# Patient Record
Sex: MALE | Race: WHITE | Employment: FULL TIME | URBAN - METROPOLITAN AREA
[De-identification: names, ages, dates, MRNs, and addresses within clinical notes are randomized per-mention and may not be internally consistent; named-entity substitution may affect disease eponyms.]

---

## 2017-01-17 ENCOUNTER — HOSPITAL ENCOUNTER (OUTPATIENT)
Dept: LAB | Facility: CLINIC | Age: 42
Discharge: HOME | End: 2017-01-17
Attending: PHYSICIAN ASSISTANT | Admitting: PHYSICIAN ASSISTANT

## 2017-01-17 ENCOUNTER — OFFICE VISIT (OUTPATIENT)
Dept: SURGERY | Facility: CLINIC | Age: 42
End: 2017-01-17

## 2017-01-17 VITALS
RESPIRATION RATE: 16 BRPM | TEMPERATURE: 97.8 F | HEART RATE: 70 BPM | BODY MASS INDEX: 43.08 KG/M2 | OXYGEN SATURATION: 96 % | SYSTOLIC BLOOD PRESSURE: 108 MMHG | HEIGHT: 71 IN | DIASTOLIC BLOOD PRESSURE: 62 MMHG | WEIGHT: 307.7 LBS

## 2017-01-17 DIAGNOSIS — Z13.228 SCREENING FOR ENDOCRINE, NUTRITIONAL, METABOLIC AND IMMUNITY DISORDER: ICD-10-CM

## 2017-01-17 DIAGNOSIS — Z13.29 SCREENING FOR ENDOCRINE, NUTRITIONAL, METABOLIC AND IMMUNITY DISORDER: ICD-10-CM

## 2017-01-17 DIAGNOSIS — E11.9 TYPE 2 DIABETES MELLITUS WITHOUT COMPLICATION, WITHOUT LONG-TERM CURRENT USE OF INSULIN (H): ICD-10-CM

## 2017-01-17 DIAGNOSIS — E66.01 MORBID OBESITY WITH BMI OF 40.0-44.9, ADULT (H): ICD-10-CM

## 2017-01-17 DIAGNOSIS — E78.5 DYSLIPIDEMIA: ICD-10-CM

## 2017-01-17 DIAGNOSIS — Z13.21 SCREENING FOR ENDOCRINE, NUTRITIONAL, METABOLIC AND IMMUNITY DISORDER: ICD-10-CM

## 2017-01-17 DIAGNOSIS — E78.5 DYSLIPIDEMIA: Primary | ICD-10-CM

## 2017-01-17 DIAGNOSIS — Z13.0 SCREENING FOR ENDOCRINE, NUTRITIONAL, METABOLIC AND IMMUNITY DISORDER: ICD-10-CM

## 2017-01-17 DIAGNOSIS — Z13.0 SCREENING FOR IRON DEFICIENCY ANEMIA: ICD-10-CM

## 2017-01-17 DIAGNOSIS — E66.01 MORBID OBESITY WITH BMI OF 40.0-44.9, ADULT (H): Primary | ICD-10-CM

## 2017-01-17 LAB
ALBUMIN SERPL-MCNC: 4.1 G/DL (ref 3.4–5)
ALP SERPL-CCNC: 69 U/L (ref 40–150)
ALT SERPL W P-5'-P-CCNC: 33 U/L (ref 0–70)
ANION GAP SERPL CALCULATED.3IONS-SCNC: 7 MMOL/L (ref 3–14)
AST SERPL W P-5'-P-CCNC: 22 U/L (ref 0–45)
BILIRUB SERPL-MCNC: 0.5 MG/DL (ref 0.2–1.3)
BUN SERPL-MCNC: 11 MG/DL (ref 7–30)
CALCIUM SERPL-MCNC: 9.3 MG/DL (ref 8.5–10.1)
CHLORIDE SERPL-SCNC: 103 MMOL/L (ref 94–109)
CO2 SERPL-SCNC: 28 MMOL/L (ref 20–32)
CREAT SERPL-MCNC: 0.84 MG/DL (ref 0.66–1.25)
ERYTHROCYTE [DISTWIDTH] IN BLOOD BY AUTOMATED COUNT: 12.7 % (ref 10–15)
GFR SERPL CREATININE-BSD FRML MDRD: NORMAL ML/MIN/1.7M2
GLUCOSE SERPL-MCNC: 92 MG/DL (ref 70–99)
HBA1C MFR BLD: 6.3 % (ref 4.3–6)
HCT VFR BLD AUTO: 43.5 % (ref 40–53)
HGB BLD-MCNC: 14.9 G/DL (ref 13.3–17.7)
MCH RBC QN AUTO: 30.2 PG (ref 26.5–33)
MCHC RBC AUTO-ENTMCNC: 34.3 G/DL (ref 31.5–36.5)
MCV RBC AUTO: 88 FL (ref 78–100)
PLATELET # BLD AUTO: 270 10E9/L (ref 150–450)
POTASSIUM SERPL-SCNC: 4 MMOL/L (ref 3.4–5.3)
PROT SERPL-MCNC: 8.3 G/DL (ref 6.8–8.8)
RBC # BLD AUTO: 4.93 10E12/L (ref 4.4–5.9)
SODIUM SERPL-SCNC: 138 MMOL/L (ref 133–144)
WBC # BLD AUTO: 10.1 10E9/L (ref 4–11)

## 2017-01-17 PROCEDURE — 85027 COMPLETE CBC AUTOMATED: CPT | Performed by: PHYSICIAN ASSISTANT

## 2017-01-17 PROCEDURE — 97802 MEDICAL NUTRITION INDIV IN: CPT | Performed by: DIETITIAN, REGISTERED

## 2017-01-17 PROCEDURE — 99205 OFFICE O/P NEW HI 60 MIN: CPT | Performed by: PHYSICIAN ASSISTANT

## 2017-01-17 PROCEDURE — 80053 COMPREHEN METABOLIC PANEL: CPT | Performed by: PHYSICIAN ASSISTANT

## 2017-01-17 PROCEDURE — 83036 HEMOGLOBIN GLYCOSYLATED A1C: CPT | Performed by: PHYSICIAN ASSISTANT

## 2017-01-17 PROCEDURE — 36415 COLL VENOUS BLD VENIPUNCTURE: CPT | Performed by: PHYSICIAN ASSISTANT

## 2017-01-17 PROCEDURE — 82306 VITAMIN D 25 HYDROXY: CPT | Performed by: PHYSICIAN ASSISTANT

## 2017-01-17 NOTE — PROGRESS NOTES
Name: MARS PIERCE  : 1975  Gender: Male  MRN: [id number]  Age: 41  INITIAL BARIATRIC NUTRITION ASSESSMENT  REASON FOR VISIT:  MARS PIERCE is a 41 year old Male presents today for initial nutrition visit for bariatric surgery. He was accompanied by his wife, Pineda.  DIAGNOSIS:  Class III Obesity  ANTHROPOMETRICS:  Height: 71 inches  Weight: 307.7 lbs  BMI: 42.9 kg/m2  CURRENT SUPPLEMENTS:  Multivitamin/Mineral: none  WEIGHT LOSS ATTEMPTS:  Atkins  Prescription diet medications:  None  NUTRITION HISTORY:  Meals per day: 2  Snacking: Yes  Breakfast: 2 florence bead. 2 eggs/ or bowl of cearal  Lunch: skip  Supper: 5pm / bowel of rice -4-6 oz protien / some vegtables  Snacks: chips/ vegi/ fruits  Drinks: water / no pop or juice/ 8 oz coffee twice daily/ cup of tea late night  Emotional eating: Yes  Binge eating: Yes  Night eating: No  Can you afford three meals per day? Yes  Can you afford the $50-60 per month for vitamins? Yes  Comments: Pt has been thinking about surgery for a few years, does not know anyone who has had it but has done some research online. Pt is a restaurant owner. Has 5 kids and shares that family meals are a concern of his with the changes he'll be making. Pt and wife had many appropriate questions today. Binge eating and emotional eating discussed with PA - will come home from work hungry after skipping meals and then overeat. Pt does not consume pork.   DINING OUT HISTORY:  Frequency per week: Around once a week  Location: sit-down restaurants, fast food  Type of food: i eat taco bell at least 2 times aweek.   PHYSICAL ACTIVITY:  None  NUTRITION DIAGNOSIS:  Patient with excessive oral food/beverage intake related to intake of calorically dense food/beverages at meals and/or snacks as evidenced by BMI of 42.9kg/m2.   INTERVENTION:  Nutrition Prescription: Recommend nutrient/ energy modification   Goals:  Begin taking multivitamin, calcium and vitamin D supplements according to handbook.   Increase  exercise to 3x/week.   [place] with protein drinks   Practice chewing to applesauce consistency   Practice  fluids from meals by 30 minutes   Practice taking 20-30 minutes to finish your meal.   Implementation:  Provided written guidelines for Laparoscopic Gastric Bypass surgery.  Provided weight loss suggestions.  Explained diet changes that would occur after surgery.  Emphasized importance of diet and lifestyle modifications.  Discussed necessity for chewable multivitamin/ mineral supplements, calcium with vitamin D, vitamin B-12, vitamin D, Iron and vitamin C supplements.  NUTRITION MONITORING AND EVALUATION:  Verbalizes understanding of surgery diet guidelines.  Anticipated compliance: Fair to good    FOLLOW UP: Follow up  Recommend 2 to 3 follow up visits to assist with lifestyle changes or per insurance requirements.  RD name and number provided.  TIME SPENT WITH PATIENT: 50 minutes  Rachel Winston RD, LD  Clinical Dietitian

## 2017-01-17 NOTE — Clinical Note
Thank you for referring this patient to our bariatric clinic for an initial evaluation.  He has been going to Dr. Wilcox with [facility] for the past 2 years but would like to get reestablished with you.  He says you originally referred him for bariatric surgery.  I look forward to working with you during this process.  Here is a copy of my visit.  Please let me know if you have questions.  Sincerely,   Mely Cyr PA-C

## 2017-01-17 NOTE — MR AVS SNAPSHOT
After Visit Summary   1/17/2017    Edelmira Sharp    MRN: [id number]           Patient Information     Date Of Birth          1975        Visit Information        Provider Department      1/17/2017 1:00 PM Mely Cyr PA-C [facility]      Today's Diagnoses     Dyslipidemia    -  1     Screening for iron deficiency anemia         Screening for endocrine, nutritional, metabolic and immunity disorder         Morbid obesity with BMI of 40.0-44.9, adult (H)         Type 2 diabetes mellitus without complication, without long-term current use of insulin (H)           Care Instructions    Please refer to your task list created today at your appointment.  Make appointment to return to clinic in 1 month to see the dietician.  Have initial labs drawn.     [facility] if you would like to have your blood tests drawn today or call [phone] to make an appointment to have them drawn on another day.  You may also get your blood drawn at your [facility] home clinic.          Follow-ups after your visit        Follow-up notes from your care team     Return in 1 month (on 2/17/2017).      Future tests that were ordered for you today     Open Future Orders        Priority Expected Expires Ordered    CBC with platelets Routine  7/16/2017 1/17/2017    Comprehensive metabolic panel Routine  7/16/2017 1/17/2017    Hemoglobin A1c Routine  7/16/2017 1/17/2017    Vitamin D Deficiency Routine  7/16/2017 1/17/2017            Who to contact     If you have questions or need follow up information about today's clinic visit or your schedule please contact [facility] directly at [phone].  Normal or non-critical lab and imaging results will be communicated to you by MyChart, letter or phone within 4 business days after the clinic has received the results. If you do not  "hear from us within 7 days, please contact the clinic through [employer] or phone. If you have a critical or abnormal lab result, we will notify you by phone as soon as possible.  Submit refill requests through [employer] or call your pharmacy and they will forward the refill request to us. Please allow 3 business days for your refill to be completed.          Additional Information About Your Visit        [employer] Information     [employer] gives you secure access to your electronic health record. If you see a primary care provider, you can also send messages to your care team and make appointments. If you have questions, please call your primary care clinic.  If you do not have a primary care provider, please call [phone] and they will assist you.        Care EveryWhere ID     This is your Care EveryWhere ID. This could be used by other organizations to access your [facility] medical records  [id number]        Your Vitals Were     Respirations Height BMI (Body Mass Index)             16 5' 11\" (1.803 m) 42.93 kg/m2          Blood Pressure from Last 3 Encounters:   10/27/14 130/80   03/13/14 121/78    Weight from Last 3 Encounters:   01/17/17 307 lb 11.2 oz (139.572 kg)   10/27/14 307 lb 12.8 oz (139.617 kg)   03/13/14 313 lb (141.976 kg)              We Performed the Following     OP ROOMING NOTE TO MICHELET        Primary Care Provider Office Phone # Fax #    Smita Montoya [phone]       [facility] [address] [facility] [address]        Thank you!     Thank you for choosing [facility]  for your care. Our goal is always to provide you with excellent care. Hearing back from our patients is one way we can continue to improve our services. Please take a few minutes to complete the written survey that you may receive in the mail after your visit with us. Thank you!             Your Updated Medication List - Protect others around you: Learn how to safely " use, store and throw away your medicines at www.disposemymeds.org.          This list is accurate as of: 1/17/17  2:21 PM.  Always use your most recent med list.                   Brand Name Dispense Instructions for use    metFORMIN 1000 MG tablet    GLUCOPHAGE     Take 500 mg by mouth 2 times daily (with meals)

## 2017-01-17 NOTE — PATIENT INSTRUCTIONS
Please refer to your task list created today at your appointment.  Make appointment to return to clinic in 1 month to see the dietician.  Have initial labs drawn.     [facility] if you would like to have your blood tests drawn today or call [phone] to make an appointment to have them drawn on another day.  You may also get your blood drawn at your [facility] clinic.

## 2017-01-17 NOTE — PROGRESS NOTES
"[facility]  [address]  [place] [address]  Phone [phone] Fax [phone]    Date: 2017    RE:   MARS PIERCE  MR#: [id number]  : 1975    Dear [facility]    I had the pleasure of seeing your patient, MARS PIERCE, to evaluate his obesity and consider him for possible weight loss surgery.  As you know, MARS is 41 years old. He has been overweight since early childhood. He has been morbidly obese for the last 15 years and has been unable to achieve long-term success with weight loss attempts, such as: Atkins.     Comorbidities of obesity from his past medical history include: High cholesterol, Type II Diabetes- dx .  Current HgA1C 6.    The symptoms related to his obesity include Knee pain and Shortness of Breath with activity.    Non-Obesity Related Past Medical History:  None    Past Surgical History: No previous bleeding, anesthesia, or nausea concerns with surgery.  Appendix removal    Family History:  Father- High cholesterol  Mother- Diabetes  PGF- High blood pressure  PGM- Diabetes;High blood pressure;High cholesterol;Obese  Sibling 1- Type II diabetes  Sibling 2- None  Sibling 3- None    Social History:  He is  to Prashant. They have 5 children.  Ages 11 to 2 years old.  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    ETOH: Never  Illicit Drug Use: None  Tobacco Use: No  Support system: prashant perera will be available to help the patient in the early post op period. prashant perera is who the patient can count on for support throughout his weight loss journey.  Can you afford three meals per day? Yes  ?Can you afford the $50-60 per month for vitamins? Yes    A 14 point review of system shows:  Musculoskeletal: Knee pain,  Pulmonary: Shortness of Breath with activity    /62 mmHg  Pulse 70  Temp(Src) 97.8  F (36.6  C)  Resp 16  Ht 5' 11\" (1.803 m)  Wt 307 lb 11.2 oz (139.572 kg)  BMI 42.93 kg/m2  SpO2 96%    PHYSICAL " EXAMINATION:    GENERAL: obese, good health, good development, and normal affect., Alert and oriented x3. NAD  HEENT: Trachea midline, Neck supple without lymphadenopathy, thyroidmegaly, or mass., Oral dentition adequate for chewing  CARDIOVASCULAR: Regular rate and rhythm, No murmurs, rubs or gallops  RESPIRATORY: Good breath sounds, Lung sounds clear to auscultation bilaterally  GASTROINTESTINAL: Obese, Soft, Nontender, Non-distended, No organomeagly or masses  LOWER EXTREMITIES: No LE edema. No cyanosis, ulceration, or chronic venous stasis  MUSCULOSKELETAL: Normal gait, Moves all 4 extremities equal and strong  NEUROLOGIC: cranial nerves II-XII grossly intact  SKIN: No intertriginous irritation or rash    In summary, MARS is morbidly obese with a body mass index of 42.9 kg/m2 and type II diabetes and hyperlipidemia. He attended an informational seminar and is a candidate for gastric bypass. He will have to complete the following pre-requisites:    Received weight loss goal of 5 lb prior to surgery.  Achieve clearance from dietitian to see surgeon. (pt unsure if his insurance requires structured weight loss- he will review and let us know at his next visit)  Have preoperative laboratory tests drawn. (CBC, CMP, HgA1C, Vitamin D)  Psychological Evaluation with MMPI and clearance for weight loss surgery.    Today in the office we discussed gastric bypass surgery.  Preoperative, perioperative, and postoperative processes, management, and follow up were addressed.  Risks and benefits were outlined including the risk of death, PE, DVT, ulcer, N/V, stricture, hernia, wound infection, weight regain, and vitamin deficiencies. I emphasized exercise and activity along with appropriate food choice as the main foundation for weight loss with surgery providing surgical reinforcement of this. All questions were answered.   A goal sheet and support group handout were given to the patient. Patient contract was signed.     Once  the patient has completed the requirements set forth in paragraph one, and there are no further recommendations, he will be allowed to see the surgeon of his choice for consultation on the gastric bypass surgery. Patient verbalizes understanding of the process to surgery and expectations for the postoperative period including the need for lifelong lifestyle changes, vitamin supplementation, and laboratory monitoring.    Thank you for allowing us to participate in his care.    This was a 60 minute visit with greater than 50% spent on counseling.      Sincerely,        Mely Cyr PA-C    At [facility] we are committed to providing you exceptional care. Our surgeons specialize in performing the following minimally invasive weight-loss surgeries:    Mauro-en-Y gastric bypass  Laparoscopic adjustable gastric band  Sleeve gastrectomy    If you would like to review aspects of our weight loss surgery program, please visit our website at [email or web address]. If you have any questions, please do not hesitate to contact us at [phone].

## 2017-01-18 LAB — DEPRECATED CALCIDIOL+CALCIFEROL SERPL-MC: 22 UG/L (ref 20–75)

## 2017-02-16 ENCOUNTER — OFFICE VISIT (OUTPATIENT)
Dept: SURGERY | Facility: CLINIC | Age: 42
End: 2017-02-16
Payer: MEDICAID

## 2017-02-16 DIAGNOSIS — E66.01 MORBID OBESITY WITH BMI OF 40.0-44.9, ADULT (H): ICD-10-CM

## 2017-02-16 PROCEDURE — 97803 MED NUTRITION INDIV SUBSEQ: CPT | Performed by: DIETITIAN, REGISTERED

## 2017-02-16 NOTE — PROGRESS NOTES
PRE-SURGICAL WEIGHT LOSS NUTRITION APPOINTMENT  DATE OF VISIT: 2017  Name: MARS PIERCE  : 1975  Gender: Male  MRN: [id number]  Age: 41  ASSESSMENT  REASON FOR VISIT:  MARS PIERCE is a 41 year old Male presents today for a pre-surgical weight loss follow-up appointment.  DIAGNOSIS:  Class III Obesity    ANTHROPOMETRICS:  Height: 71 inches  Initial Weight: 307.7 lbs  Weight last visit: 307.7 lbs  Current Weight: 299.6 lbs  BMI: 41.8 kg/m2    NUTRITION HISTORY:  Breakfast: (10am; wakes at 9am) cup of oatmeal w/milk, 2 eggs w/olive oil, 1 piece of wheat toast, florence bread w/hummus  Lunch: skips or has veggies, or veggies w/rice (Sami food)  Supper: (4pm)steak, chicken breast, lentil soup, lamb, vegetables, stews  Snacks: vegetables and fruits  Drinks: water - 60oz  Consuming liquid calories: none  Eating 3 meals per day: Sometimes  Eating slower: sometimes  Chewing foods thoroughly:Sometimes  Take 30 minutes to consume each meal: Sometimes  Fluids and meals separate by at least 30 minutes: Yes  Comments: Pt has been thinking about surgery for a few years, does not know anyone who has had it but has done some research online. 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 Pt and wife had many appropriate questions today. : Pt with excellent weight loss and progress on goals. Recently changed insurance - will call tomorrow to discuss structured weight loss requirements. Many questions regarding body fat percentage, weight loss expectations and appropriate BMI; showed pt how to calculate his BMI and discussed healthy ranges.   Desired Surgical Procedure: Laparoscopic Gastric Bypass  PHYSICAL ACTIVITY:  Type: Cardio;Lift weights  Frequency: 3-4x/week  Duration (min): 60  DIAGNOSIS:  Previous Nutrition Diagnosis: Obesity related to excess energy intake as evidenced by BMI of 42.9kg/m2.   Unchanged, modified below.   Previous goals:  Begin taking multivitamin, calcium and  vitamin D supplements according to handbook. - met  Increase exercise to 3x/week. - met  [place] with protein drinks - met  Practice chewing to applesauce consistency - met  Practice  fluids from meals by 30 minutes - met  Practice taking 20-30 minutes to finish your meal. - met  Current Nutrition Diagnosis: Obesity related to excess energy intake as evidenced by BMI of 41.8kg/m2.   IMPLEMENTATION:  Nutrition Prescription: Recommended energy/nutrient modification  Goals  Continue to practice  fluids and meals by 30 minutes.   Practice chewing to applesauce consistency.   Take 20-30 minutes to finish a meal.   Eliminate final cup of coffee.   Eat 3 meals a day - every 5-6 hours.   Implementation:  - Discussed progress towards previous goals  - Reinforced importance of making behavior changes in preparation for bariatric surgery.  NUTRITION MONITORING AND EVALUATION:  Anticipated compliance: Good  Patient verbalized good understanding of bariatric diet guidelines.    Follow up: in 1 month  # of visits needed: 1; or per insurance requirements (see comments)  Cleared by RD: No  TIME SPENT WITH PATIENT: 25 minutes  Rachel Winston RD, LD  Clinical Dietitian

## 2017-02-16 NOTE — MR AVS SNAPSHOT
MRN:[id number]                      After Visit Summary   2/16/2017    Edelmira Sharp    MRN: [id number]           Visit Information        Provider Department      2/16/2017 11:30 AM 3Wendy RD [facility] [place] [facility]      Your next 10 appointments already scheduled     Mar 16, 2017 11:30 AM CDT   Weight Loss Visit with Sh Wl Diet 3, RD   [facility])    [address] [place] [id number] [phone]              MyChart Information     [employer] gives you secure access to your electronic health record. If you see a primary care provider, you can also send messages to your care team and make appointments. If you have questions, please call your primary care clinic.  If you do not have a primary care provider, please call [phone] and they will assist you.        Care EveryWhere ID     This is your Care EveryWhere ID. This could be used by other organizations to access your [facility] medical records  [id number]

## 2017-03-16 ENCOUNTER — OFFICE VISIT (OUTPATIENT)
Dept: SURGERY | Facility: CLINIC | Age: 42
End: 2017-03-16
Payer: COMMERCIAL

## 2017-03-16 DIAGNOSIS — E66.01 MORBID OBESITY WITH BMI OF 40.0-44.9, ADULT (H): ICD-10-CM

## 2017-03-16 PROCEDURE — 97803 MED NUTRITION INDIV SUBSEQ: CPT | Performed by: DIETITIAN, REGISTERED

## 2017-03-16 NOTE — MR AVS SNAPSHOT
MRN:[id number]                      After Visit Summary   3/16/2017    Edelmira Sharp    MRN: [id number]           Visit Information        Provider Department      3/16/2017 11:30 AM 3Wendy RD [facility] [place] [facility]      Your next 10 appointments already scheduled     Apr 18, 2017  2:30 PM CDT   Weight Loss Visit with Sh Wl Diet 3, RD   [facility] ([facility])    [address] [place] [id number] [phone]              MyChart Information     [employer] gives you secure access to your electronic health record. If you see a primary care provider, you can also send messages to your care team and make appointments. If you have questions, please call your primary care clinic.  If you do not have a primary care provider, please call [phone] and they will assist you.        Care EveryWhere ID     This is your Care EveryWhere ID. This could be used by other organizations to access your [facility] medical records  [id number]

## 2017-03-16 NOTE — PROGRESS NOTES
PRE-SURGICAL WEIGHT LOSS NUTRITION APPOINTMENT  DATE OF VISIT: 3/16/2017  Name: MARS PIERCE  : 1975  Gender: Male  MRN: [id number]  Age: 41  ASSESSMENT  REASON FOR VISIT:  MARS PIERCE is a 41 year old Male presents today for a pre-surgical weight loss follow-up appointment. He was accompanied by his wife, Pineda.   DIAGNOSIS:  Class III Obesity    ANTHROPOMETRICS:  Height: 71 inches  Initial Weight: 307.7 lbs  Weight last visit: 299.6 lbs  Current Weight: 297.9 lbs  BMI: 41.5 kg/m2    NUTRITION HISTORY:  Breakfast: eggs, hummus, small/thin florence bread  Lunch: (sometimes skips d/t work)   Supper: soups, stews  Snacks: none  Drinks: water - 60oz, small amount of coffee  Consuming liquid calories: none  Eating 3 meals per day: No  Eating slower: No  Chewing foods thoroughly: Yes  Take 30 minutes to consume each meal: Sometimes  Fluids and meals separate by at least 30 minutes: Yes  Comments: Pt has been thinking about surgery for a few years, does not know anyone who has had it but has done some research online. 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 Pt and wife had many appropriate questions today. : Pt with excellent weight loss and progress on goals. Recently changed insurance - will call tomorrow to discuss structured weight loss requirements. 3/16: Pt has switched to [employer] - has first phone call     3/24. Pt and wife share some concerns they have regarding surgery - weakness, complications, etc. They are fairly sure they want to pursue surgery but not quite 100% certain. Encouraged pt that it's normal and appropriate to have anxieties about surgery and that there's no need to rush the process.   Desired Surgical Procedure: Laparoscopic Gastric Bypass  PHYSICAL ACTIVITY:  Type: Cardio;Lift weights  Frequency: 3-4x/week  Duration (min): 60  DIAGNOSIS:  Previous Nutrition Diagnosis: Obesity related to excess energy intake as evidenced by BMI of  41.8kg/m2.   Unchanged, modified below.   Previous goals:  Continue to practice  fluids and meals by 30 minutes. - met  Practice chewing to applesauce consistency. -improving  Take 20-30 minutes to finish a meal. -improving  Eliminate final cup of coffee. - met (however just gave up yesterday)  Eat 3 meals a day - every 5-6 hours. - not met  Current Nutrition Diagnosis: Obesity related to excess energy intake as evidenced by BMI of 41.5kg/m2.   IMPLEMENTATION:  Nutrition Prescription: Recommended energy/nutrient modification  Goals:  Continue to practice taking 20-30 minutes per meal and chewing to applesauce consistency.   Eliminate coffee/continue to avoid  Keep food on hand at work - don't skip lunch.   Implementation:  - Discussed progress towards previous goals  - Reinforced importance of making behavior changes in preparation for bariatric surgery.  NUTRITION MONITORING AND EVALUATION:  Anticipated compliance: Good  Patient verbalized good understanding of bariatric diet guidelines.  Follow up: 1 month  # of visits needed: 1  Cleared by RD: No  TIME SPENT WITH PATIENT: 25 minutes  Rachel Winston RD, LD  Clinical Dietitian

## 2017-04-18 ENCOUNTER — OFFICE VISIT (OUTPATIENT)
Dept: SURGERY | Facility: CLINIC | Age: 42
End: 2017-04-18
Payer: COMMERCIAL

## 2017-04-18 DIAGNOSIS — E66.01 MORBID OBESITY WITH BMI OF 40.0-44.9, ADULT (H): ICD-10-CM

## 2017-04-18 PROCEDURE — 97803 MED NUTRITION INDIV SUBSEQ: CPT | Performed by: DIETITIAN, REGISTERED

## 2017-04-18 NOTE — MR AVS SNAPSHOT
MRN:[id number]                      After Visit Summary   4/18/2017    Edelmira Sharp    MRN: [id number]           Visit Information        Provider Department      4/18/2017 2:30 PM 3, Sh Katie Medina RD [facility] [place] [facility]      MyChart Information     [employer] gives you secure access to your electronic health record. If you see a primary care provider, you can also send messages to your care team and make appointments. If you have questions, please call your primary care clinic.  If you do not have a primary care provider, please call [phone] and they will assist you.        Care EveryWhere ID     This is your Care EveryWhere ID. This could be used by other organizations to access your [facility] medical records  [id number]

## 2017-04-18 NOTE — PROGRESS NOTES
"PRE-SURGICAL WEIGHT LOSS NUTRITION APPOINTMENT  DATE OF VISIT: 2017  Name: MARS PIERCE  : 1975  Gender: Male  MRN: [id number]  Age: 41  ASSESSMENT  REASON FOR VISIT:  MARS PIERCE is a 41 year old Male presents today for a pre-surgical weight loss follow-up appointment. He is accompanied by his wife, Pineda.   DIAGNOSIS:  Class III Obesity    ANTHROPOMETRICS:  Height: 71 inches  Initial Weight: 307.7 lbs  Weight last visit: 297.9 lbs  Current Weight: 288.0 lbs  BMI: 40.2 kg/m2    NUTRITION HISTORY:  Breakfast: eggs, hummus, small/thin florence bread  Lunch: previously skipping; lately has been bringing food from home  Supper: soups, stews  Snacks: none  Drinks: water - 60oz  Consuming liquid calories: none  Eating 3 meals per day: Yes  Eating slower: Yes  Chewing foods thoroughly: Yes  Take 30 minutes to consume each meal: Yes  Fluids and meals separate by at least 30 minutes: Yes  Comments: Pt has been thinking about surgery for a few years, does not know anyone who has had it but has done some research online. 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 Pt and wife had many appropriate questions today. : Pt with excellent weight loss and progress on goals. Recently changed insurance - will call tomorrow to discuss structured weight loss requirements. 3/16: Pt has switched to [facility] - has first phone call 3/24. Pt and wife share some concerns they have regarding surgery - weakness, complications, etc. They are fairly sure they want to pursue surgery but not quite 100% certain. Encouraged pt that it's normal and appropriate to have anxieties about surgery and that there's no need to rush the process.     : Pt states he is \"80%\" sure he wants surgery. Pt consistent with all dietary habits and excellent weight loss. Encouraged pt to address his reservations surrounding surgery at his remaining psych eval appointments. Will clear pt today however " recommended pt delay consult with surgeon if still undecided about pursuit of surgery. If timeline to surgery lengthy, would recommend check in visit with RD prior to surgery.   Desired Surgical Procedure: Laparoscopic Gastric Bypass  PHYSICAL ACTIVITY:  Type: Cardio;Lift weights  Frequency: 3-4x/week  Duration (min): 60  DIAGNOSIS:  Previous Nutrition Diagnosis: Obesity related to excess energy intake as evidenced by BMI of 41.5kg/m2.   Unchanged, modified below.   Previous goals:  Continue to practice taking 20-30 minutes per meal and chewing to applesauce consistency. - met   Eliminate coffee - met   Keep food on hand at work - don't skip lunch. - met  Current Nutrition Diagnosis: Obesity related to excess energy intake as evidenced by BMI of 40.2kg/m2.   IMPLEMENTATION:  Nutrition Prescription: Recommended energy/nutrient modification  Goals:  Continue to practice all pre-surgical bariatric guidelines.   Implementation:  - Discussed progress towards previous goals  - Reinforced importance of making behavior changes in preparation for bariatric surgery.  NUTRITION MONITORING AND EVALUATION:  Anticipated compliance: Good  Patient verbalized good understanding of bariatric diet guidelines.  Patient has met prebariatric surgery diet requirements.  Follow up: Standard post-operative follow up at clinic  # of visits needed: 0  Cleared by RD:Yes  TIME SPENT WITH PATIENT: 20 minutes  Rachel Winston RD, LD  Clinical Dietitian

## 2017-10-09 ENCOUNTER — TELEPHONE (OUTPATIENT)
Dept: SURGERY | Facility: CLINIC | Age: 42
End: 2017-10-09

## 2017-10-09 NOTE — TELEPHONE ENCOUNTER
"Patient calling to restart program.  He has been out less than 6 months.  His last visit was 4/18/17.    He still qualifies.  His stated wt. is 269# and he is 71\" BMI of 37.7.  He is diabetic and on meds. He states he also has hyperlipidemia.    He has lost all of his weight and more.  He only has to complete his psychological evaluation per task list.  He is [place] and needs 5 phone calls. I am not sure where he is with this.    Because he hasn't been out of the program less than 6 months, he can resume seeing the RD for 30\".  I will call and remind him to finish his 5 phone.  He was submitted to  3/9/17 to get the phone calls going.  Rosa Crouch MS, RD, RN    Called patient who stated that he completed his  5 phone calls.  Rosa Crouch MS, RD, RN      "

## 2017-10-16 ENCOUNTER — TRANSFERRED RECORDS (OUTPATIENT)
Dept: HEALTH INFORMATION MANAGEMENT | Facility: CLINIC | Age: 42
End: 2017-10-16

## 2017-10-17 ENCOUNTER — OFFICE VISIT (OUTPATIENT)
Dept: SURGERY | Facility: CLINIC | Age: 42
End: 2017-10-17
Payer: COMMERCIAL

## 2017-10-17 VITALS
WEIGHT: 271 LBS | HEIGHT: 71 IN | DIASTOLIC BLOOD PRESSURE: 69 MMHG | RESPIRATION RATE: 18 BRPM | HEART RATE: 67 BPM | BODY MASS INDEX: 37.94 KG/M2 | SYSTOLIC BLOOD PRESSURE: 108 MMHG | OXYGEN SATURATION: 98 % | TEMPERATURE: 98.2 F

## 2017-10-17 DIAGNOSIS — E66.9 OBESITY: ICD-10-CM

## 2017-10-17 DIAGNOSIS — E66.01 MORBID OBESITY WITH BMI OF 40.0-44.9, ADULT (H): ICD-10-CM

## 2017-10-17 DIAGNOSIS — E66.9 OBESITY (BMI 30-39.9): Primary | ICD-10-CM

## 2017-10-17 PROCEDURE — 99213 OFFICE O/P EST LOW 20 MIN: CPT | Performed by: PHYSICIAN ASSISTANT

## 2017-10-17 PROCEDURE — 97803 MED NUTRITION INDIV SUBSEQ: CPT | Performed by: DIETITIAN, REGISTERED

## 2017-10-17 NOTE — PROGRESS NOTES
"Bariatric Visit    October 17, 2017    HPI:  Pt return to clinic today after a brief hiatus for our program. He was seen by me on 1/17/2017 for an initial consult for bariatric surgery and was interested in the bypass.  He progressed in the program and  He was last seen in clinic on 4/18/2017.  At that point he saw the dietician and was cleared from a dietary standpoint. Pt was consistent with all dietary habits and had achieved excellent weight loss. Yet, He was only 80% sure he wanted surgery.      He took some time off.  Lost some more weight on his own using the the strategies we taught him.  Recently, his work because busier and he started gaining some of his weight back.  He realized now with the help of the bypass he would be able to sustain his weight loss better.  He is committed to both the lifelong activity and diet changes that will make him successful with the bypass and feels he has demonstrated that to himself over the last 6 months.    BARIATRIC METRICS:    Current Weight: 271 lb (122.9 kg)  Body mass index is 37.8 kg/(m^2).   Wt change since last visit (lbs): -17  Cumulative weight loss (lbs): 36.7     VITALS:  /69  Pulse 67  Temp 98.2  F (36.8  C)  Resp 18  Ht 5' 11\" (1.803 m)  Wt 271 lb (122.9 kg)  SpO2 98%  BMI 37.8 kg/m2    He would like to move forward.  He has seen the psychologist.  He still needs to complete the MMPI and have a follow up visit.  We will await clearance.  He will have a follow up weight loss visit today with the dietician. He will need get clearance from her again since there has been such a long break.      ASSESSMENT:  Obesity    PLAN:  We will await clearance from psychologist.  Will await clearance from dietician.  Once the patient has completed the requirements set forth above and there are no further recommendations, he will be allowed to see the surgeon of his choice for consultation on the gastric bypass surgery. Patient verbalizes understanding of the " process to surgery and expectations for the postoperative period including the need for lifelong lifestyle changes and follow up.       This was a 15 minute visit with greater than 50% spent on counseling and care coordination.

## 2017-10-17 NOTE — MR AVS SNAPSHOT
MRN:[id number]                      After Visit Summary   10/17/2017    Edelmira Sharp    MRN: [id number]           Visit Information        Provider Department      10/17/2017 1:00 PM 3, Sh Katie Medina, DIANE [facility]      [facility] Information     Structure Visionhart gives you secure access to your electronic health record. If you see a primary care provider, you can also send messages to your care team and make appointments. If you have questions, please call your primary care clinic.  If you do not have a primary care provider, please call [phone] and they will assist you.        Care EveryWhere ID     This is your Care EveryWhere ID. This could be used by other organizations to access your [facility] medical records  [id number]        Equal Access to Services     INGRIS CARRERA : Gisell Amado, waanahy cody, justyna kaalgissell cabrera, ivan gomez. So [facility] [phone].    ATENCIÓN: Si habla español, tiene a alcazar disposición servicios gratuitos de asistencia lingüística. Llame al [phone].    We comply with applicable federal civil rights laws and [place] laws. We do not discriminate on the basis of race, color, national origin, age, disability, sex, sexual orientation, or gender identity.

## 2017-10-17 NOTE — PROGRESS NOTES
"PRE-SURGICAL WEIGHT LOSS NUTRITION APPOINTMENT  DATE OF VISIT: 10/17/2017  Name: MARS PIERCE  : 1975  Gender: Male  MRN: [id number]  Age: 41  ASSESSMENT  REASON FOR VISIT:  MARS PIERCE is a 41 year old Male presents today for a pre-surgical weight loss follow-up appointment.  DIAGNOSIS:  Class II Obesity    ANTHROPOMETRICS:  Height: 71 inches  Initial Weight: 307.7 lbs  Weight last visit: 288.0 lbs  Current Weight: 270.0 lbs  BMI: 37.7 kg/m2    NUTRITION HISTORY:  Breakfast: skips or piece of toast and two eggs   Lunch: restaurant food at work; chicken breast or wings + 3oz French fries  Supper: stew with vegetables and beef or chicken, small portion of rice   Snacks: rare; apple or vegetable   Drinks: water - 48oz/day  Consuming liquid calories: none  Eating 3 meals per day: Yes  Eating slower: Yes  Chewing foods thoroughly: Yes  Take 30 minutes to consume each meal: Yes  Fluids and meals separate by at least 30 minutes: Yes  Comments: Pt has been thinking about surgery for a few years, does not know anyone who has had it but has done some research online. 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 Pt and wife had many appropriate questions today. : Pt with excellent weight loss and progress on goals. Recently changed insurance - will call tomorrow to discuss structured weight loss requirements. 3/16: Pt has switched to [facility] - has first phone call 3/24. Pt and wife share some concerns they have regarding surgery - weakness, complications, etc. They are fairly sure they want to pursue surgery but not quite 100% certain. Encouraged pt that it's normal and appropriate to have anxieties about surgery and that there's no need to rush the process. : Pt states he is \"80%\" sure he wants surgery. Pt consistent with all dietary habits and excellent weight loss. Encouraged pt to address his reservations surrounding surgery at his remaining psych eval " appointments. Will clear pt today however recommended pt delay consult with surgeon if still undecided about pursuit of surgery.     10/17: Pt took a break from the program thinking he would focus on non-surgical weight loss. He was very successful at further weight loss by reducing the amount of carbohydrates and simple sugars he consumes. Over the last month, work has gotten stressful again and he has come to the realization that he does need a tool to help him stay on track with his busy life. Has last psych appointment tomorrow. Reviewed bariatric lifestyle habits as well as post-op full liquid diet. Encouraged pt to re-read handbook prior to surgery to familiarize self with post-op diet phases.   Desired Surgical Procedure: Laparoscopic Gastric Bypass  PHYSICAL ACTIVITY:  Type: Walking  Frequency: Daily  Duration (min): 20  DIAGNOSIS:  Previous Nutrition Diagnosis: Obesity related to excess energy intake as evidenced by BMI of 40.2kg/m2  Unchanged, modified below.   Previous goals:  Continue to practice all pre-surgical bariatric guidelines. - met  Current Nutrition Diagnosis: Obesity related to excess energy intake as evidenced by BMI of 37.7kg/m2.   IMPLEMENTATION:  Nutrition Prescription: Recommended energy/nutrient modification  Goals:  Restart multivitamin, calcium and vitamin D supplements   Get back to eating 3 meals/day   Continue to practice all pre-op guidelines.   Implementation:  - Discussed progress towards previous goals  - Reinforced importance of making behavior changes in preparation for bariatric surgery.  NUTRITION MONITORING AND EVALUATION:  Anticipated compliance: Good  Patient verbalized good understanding of bariatric diet guidelines.  Patient has met prebariatric surgery diet requirements.  Follow up: Standard post-operative follow up at clinic  # of visits needed: 0  Cleared by RD:Yes (previously cleared)  TIME SPENT WITH PATIENT: 20 minutes  Rachel Winston RD, LD  Clinical  Dietitian

## 2017-10-17 NOTE — MR AVS SNAPSHOT
"              After Visit Summary   10/17/2017    Edelmira Sharp    MRN: [id number]           Patient Information     Date Of Birth          1975        Visit Information        Provider Department      10/17/2017 10:30 AM Mely Cyr PA-C [facility]      Today's Diagnoses     Obesity (BMI 30-39.9)    -  1       Follow-ups after your visit        Who to contact     If you have questions or need follow up information about today's clinic visit or your schedule please contact [facility] directly at [phone].  Normal or non-critical lab and imaging results will be communicated to you by OOH[employer]AL[employer] Mobilehart, letter or phone within 4 business days after the clinic has received the results. If you do not hear from us within 7 days, please contact the clinic through O[employer] Mobilehart or phone. If you have a critical or abnormal lab result, we will notify you by phone as soon as possible.  Submit refill requests through [employer] or call your pharmacy and they will forward the refill request to us. Please allow 3 business days for your refill to be completed.          Additional Information About Your Visit        MyChart Information     [employer] gives you secure access to your electronic health record. If you see a primary care provider, you can also send messages to your care team and make appointments. If you have questions, please call your primary care clinic.  If you do not have a primary care provider, please call [phone] and they will assist you.        Care EveryWhere ID     This is your Care EveryWhere ID. This could be used by other organizations to access your [facility] medical records  [id number]        Your Vitals Were     Pulse Temperature Respirations Height Pulse Oximetry BMI (Body Mass Index)    67 98.2  F (36.8  C) 18 5' 11\" (1.803 m) 98% 37.8 kg/m2       Blood Pressure from Last 3 Encounters: "   10/17/17 108/69   01/17/17 108/62   10/27/14 130/80    Weight from Last 3 Encounters:   10/17/17 271 lb (122.9 kg)   01/17/17 (!) 307 lb 11.2 oz (139.6 kg)   10/27/14 (!) 307 lb 12.8 oz (139.6 kg)              We Performed the Following     OP ROOMING NOTE TO MICHELET        Primary Care Provider Office Phone # Fax #    Smita Montoya [phone] [address] [facility] [address]        Equal Access to Services     [facility] : Hadii lavinia samuels hadashlisbet Soblu, waaxda lugeovanny, qaybta kaalmaabigail cabrera, ivan kidd . So [facility] [phone].    ATENCIÓN: Si habla español, tiene a alcazar disposición servicios gratuitos de asistencia lingüística. Llame al [phone].    We comply with applicable federal civil rights laws and [place] laws. We do not discriminate on the basis of race, color, national origin, age, disability, sex, sexual orientation, or gender identity.            Thank you!     Thank you for choosing [facility]  for your care. Our goal is always to provide you with excellent care. Hearing back from our patients is one way we can continue to improve our services. Please take a few minutes to complete the written survey that you may receive in the mail after your visit with us. Thank you!             Your Updated Medication List - Protect others around you: Learn how to safely use, store and throw away your medicines at www.disposemymeds.org.          This list is accurate as of: 10/17/17  1:17 PM.  Always use your most recent med list.                   Brand Name Dispense Instructions for use Diagnosis    metFORMIN 1000 MG tablet    GLUCOPHAGE     Take 500 mg by mouth 2 times daily (with meals)

## 2017-11-16 ENCOUNTER — OFFICE VISIT (OUTPATIENT)
Dept: SURGERY | Facility: CLINIC | Age: 42
End: 2017-11-16
Payer: COMMERCIAL

## 2017-11-16 VITALS
SYSTOLIC BLOOD PRESSURE: 105 MMHG | HEART RATE: 97 BPM | WEIGHT: 265.44 LBS | DIASTOLIC BLOOD PRESSURE: 45 MMHG | BODY MASS INDEX: 37.02 KG/M2

## 2017-11-16 DIAGNOSIS — E66.9 OBESITY (BMI 30-39.9): Primary | ICD-10-CM

## 2017-11-16 PROCEDURE — 99204 OFFICE O/P NEW MOD 45 MIN: CPT | Performed by: SURGERY

## 2017-11-16 NOTE — MR AVS SNAPSHOT
After Visit Summary   11/16/2017    Edelmira Sharp    MRN: [id number]           Patient Information     Date Of Birth          1975        Visit Information        Provider Department      11/16/2017 2:00 PM Niels Jang MD [facility]      Today's Diagnoses     Obesity (BMI 30-39.9)    -  1       Follow-ups after your visit        Who to contact     If you have questions or need follow up information about today's clinic visit or your schedule please contact [facility] directly at [phone].  Normal or non-critical lab and imaging results will be communicated to you by [email or web address], letter or phone within 4 business days after the clinic has received the results. If you do not hear from us within 7 days, please contact the clinic through [email or web address] or phone. If you have a critical or abnormal lab result, we will notify you by phone as soon as possible.  Submit refill requests through [employer] or call your pharmacy and they will forward the refill request to us. Please allow 3 business days for your refill to be completed.          Additional Information About Your Visit        MyChart Information     [employer] gives you secure access to your electronic health record. If you see a primary care provider, you can also send messages to your care team and make appointments. If you have questions, please call your primary care clinic.  If you do not have a primary care provider, please call [phone] and they will assist you.        Care EveryWhere ID     This is your Care EveryWhere ID. This could be used by other organizations to access your [facility] medical records  [id number]        Your Vitals Were     Pulse BMI (Body Mass Index)                97 37.02 kg/m2           Blood Pressure from Last 3 Encounters:   11/16/17 105/45   10/17/17 108/69   01/17/17 108/62    Weight from Last 3  Encounters:   11/16/17 120.4 kg (265 lb 7 oz)   10/17/17 122.9 kg (271 lb)   01/17/17 (!) 139.6 kg (307 lb 11.2 oz)              We Performed the Following     OP ROOMING NOTE TO MICHELET        Primary Care Provider Office Phone # Fax #    Smita Montoya [phone] [address] [facility] [address]        Equal Access to Services     INGRIS CARRERA : Hadii aad ku hadasho Soomaali, waaxda luqadaha, qaybta kaalmada adeegyada, waxay idiin hayaan adeeg toy laeunice . So [facility] [phone].    ATENCIÓN: Si habla español, tiene a alcazar disposición servicios gratuitos de asistencia lingüística. [facility] [phone].    We comply with applicable federal civil rights laws and [place] laws. We do not discriminate on the basis of race, color, national origin, age, disability, sex, sexual orientation, or gender identity.            Thank you!     Thank you for choosing [facility]  for your care. Our goal is always to provide you with excellent care. Hearing back from our patients is one way we can continue to improve our services. Please take a few minutes to complete the written survey that you may receive in the mail after your visit with us. Thank you!             Your Updated Medication List - Protect others around you: Learn how to safely use, store and throw away your medicines at www.disposemymeds.org.          This list is accurate as of: 11/16/17 11:59 PM.  Always use your most recent med list.                   Brand Name Dispense Instructions for use Diagnosis    metFORMIN 1000 MG tablet    GLUCOPHAGE     Take 500 mg by mouth 2 times daily (with meals)

## 2017-11-22 NOTE — PROGRESS NOTES
Dear ,    I had the pleasure of meeting with your patient Edelmira Sharp recently in my weight loss surgery offices.  This patient is a 41 year old male who has been undergoing our preoperative screening process in anticipation of potential bariatric surgery. He states that He has been overweight since childhood and feels that He has been Morbidly obese for at least the last 15 years. He has been unsuccessful with other methods of permanent weight loss including Kendrick diet.  He has however in the past lost a significant amount of weight doing various exercise and weight loss routines. He Suffers from multiple weight related medical conditions including type 2 diabetes, hypercholesterolemia, dyspnea on exertion as well as joint pains.  Weight at the time of initial presentation was 307 and BMI was 43. Due to lack of success in achieving weight loss through other methods He is interested in undergoing bariatric surgery.    To date He Has undergone an appropriate medical evaluation, dietitian evaluation, as well as psychologic screening. He Appears to be an appropriate candidate for bariatric surgery.    In the office today with early discussed surgical options for weight loss.  The patient has expressed interest in undergoing laparoscopic Mauro-en-Y gastric bypass.  We therefore discussed this procedure in great detail.  I reviewed with the patient the risks, benefits and anticipated outcomes.  I believe that this patient has an excellent chance to achieve permanent weight loss to this procedure.  This should also allow for improvement if not resolution in the weight related medical conditions.  At this point we are going to submit for prior authorization.  In anticipation of prior authorization I am hoping for a surgery date in the near future.   All of the patient's questions were appropriately answered to satisfaction.  We will keep you updated on any progress.  If you have questions regarding care please feel  free to contact me.  Total time spent in the clinic was 45 minutes with greater than 50% in face-to-face consultation.    Sincerely,    Niels Jang M.D.    Please route or send letter to:  Primary Care Provider (PCP) and Referring Provider

## 2017-12-29 ENCOUNTER — MEDICAL CORRESPONDENCE (OUTPATIENT)
Dept: HEALTH INFORMATION MANAGEMENT | Facility: CLINIC | Age: 42
End: 2017-12-29

## 2018-02-20 ENCOUNTER — ANESTHESIA EVENT (OUTPATIENT)
Dept: SURGERY | Facility: CLINIC | Age: 43
DRG: 621 | End: 2018-02-20
Payer: COMMERCIAL

## 2018-02-21 ENCOUNTER — HOSPITAL ENCOUNTER (INPATIENT)
Facility: CLINIC | Age: 43
LOS: 2 days | Discharge: HOME | DRG: 621 | End: 2018-02-23
Attending: SURGERY | Admitting: SURGERY
Payer: COMMERCIAL

## 2018-02-21 ENCOUNTER — ANESTHESIA (OUTPATIENT)
Dept: SURGERY | Facility: CLINIC | Age: 43
DRG: 621 | End: 2018-02-21
Payer: COMMERCIAL

## 2018-02-21 ENCOUNTER — APPOINTMENT (OUTPATIENT)
Dept: SURGERY | Facility: PHYSICIAN GROUP | Age: 43
End: 2018-02-21
Payer: COMMERCIAL

## 2018-02-21 DIAGNOSIS — G89.18 ACUTE POST-OPERATIVE PAIN: ICD-10-CM

## 2018-02-21 DIAGNOSIS — Z98.84 BARIATRIC SURGERY STATUS: ICD-10-CM

## 2018-02-21 LAB
CREAT SERPL-MCNC: 0.81 MG/DL (ref 0.66–1.25)
GFR SERPL CREATININE-BSD FRML MDRD: >90 ML/MIN/1.7M2
GLUCOSE BLDC GLUCOMTR-MCNC: 111 MG/DL (ref 70–99)
GLUCOSE BLDC GLUCOMTR-MCNC: 117 MG/DL (ref 70–99)
GLUCOSE BLDC GLUCOMTR-MCNC: 124 MG/DL (ref 70–99)
GLUCOSE BLDC GLUCOMTR-MCNC: 139 MG/DL (ref 70–99)
GLUCOSE SERPL-MCNC: 108 MG/DL (ref 70–99)
HBA1C MFR BLD: 5.7 % (ref 4.3–6)
PLATELET # BLD AUTO: 249 10E9/L (ref 150–450)
POTASSIUM SERPL-SCNC: 3.9 MMOL/L (ref 3.4–5.3)

## 2018-02-21 PROCEDURE — 25000128 H RX IP 250 OP 636: Performed by: ANESTHESIOLOGY

## 2018-02-21 PROCEDURE — 71000013 ZZH RECOVERY PHASE 1 LEVEL 1 EA ADDTL HR: Performed by: SURGERY

## 2018-02-21 PROCEDURE — 43644 LAP GASTRIC BYPASS/ROUX-EN-Y: CPT | Performed by: SURGERY

## 2018-02-21 PROCEDURE — 82565 ASSAY OF CREATININE: CPT | Performed by: PHYSICIAN ASSISTANT

## 2018-02-21 PROCEDURE — 36000063 ZZH SURGERY LEVEL 4 EA 15 ADDTL MIN: Performed by: SURGERY

## 2018-02-21 PROCEDURE — 84132 ASSAY OF SERUM POTASSIUM: CPT | Performed by: ANESTHESIOLOGY

## 2018-02-21 PROCEDURE — 36000093 ZZH SURGERY LEVEL 4 1ST 30 MIN: Performed by: SURGERY

## 2018-02-21 PROCEDURE — 25000125 ZZHC RX 250: Performed by: ANESTHESIOLOGY

## 2018-02-21 PROCEDURE — 43644 LAP GASTRIC BYPASS/ROUX-EN-Y: CPT | Mod: AS | Performed by: PHYSICIAN ASSISTANT

## 2018-02-21 PROCEDURE — 27210995 ZZH RX 272: Performed by: SURGERY

## 2018-02-21 PROCEDURE — 25800025 ZZH RX 258: Performed by: SURGERY

## 2018-02-21 PROCEDURE — 00000146 ZZHCL STATISTIC GLUCOSE BY METER IP

## 2018-02-21 PROCEDURE — 25000566 ZZH SEVOFLURANE, EA 15 MIN: Performed by: SURGERY

## 2018-02-21 PROCEDURE — 36415 COLL VENOUS BLD VENIPUNCTURE: CPT | Performed by: ANESTHESIOLOGY

## 2018-02-21 PROCEDURE — 36415 COLL VENOUS BLD VENIPUNCTURE: CPT | Performed by: PHYSICIAN ASSISTANT

## 2018-02-21 PROCEDURE — 71000012 ZZH RECOVERY PHASE 1 LEVEL 1 FIRST HR: Performed by: SURGERY

## 2018-02-21 PROCEDURE — 37000008 ZZH ANESTHESIA TECHNICAL FEE, 1ST 30 MIN: Performed by: SURGERY

## 2018-02-21 PROCEDURE — 25000125 ZZHC RX 250: Performed by: SURGERY

## 2018-02-21 PROCEDURE — 83036 HEMOGLOBIN GLYCOSYLATED A1C: CPT | Performed by: PHYSICIAN ASSISTANT

## 2018-02-21 PROCEDURE — 0D164ZA BYPASS STOMACH TO JEJUNUM, PERCUTANEOUS ENDOSCOPIC APPROACH: ICD-10-PCS | Performed by: SURGERY

## 2018-02-21 PROCEDURE — 37000009 ZZH ANESTHESIA TECHNICAL FEE, EACH ADDTL 15 MIN: Performed by: SURGERY

## 2018-02-21 PROCEDURE — 25000128 H RX IP 250 OP 636: Performed by: PHYSICIAN ASSISTANT

## 2018-02-21 PROCEDURE — 12000007 ZZH R&B INTERMEDIATE

## 2018-02-21 PROCEDURE — 40000170 ZZH STATISTIC PRE-PROCEDURE ASSESSMENT II: Performed by: SURGERY

## 2018-02-21 PROCEDURE — 25000125 ZZHC RX 250: Performed by: NURSE ANESTHETIST, CERTIFIED REGISTERED

## 2018-02-21 PROCEDURE — 25000125 ZZHC RX 250: Performed by: PHYSICIAN ASSISTANT

## 2018-02-21 PROCEDURE — 25000128 H RX IP 250 OP 636: Performed by: SURGERY

## 2018-02-21 PROCEDURE — 82947 ASSAY GLUCOSE BLOOD QUANT: CPT | Performed by: ANESTHESIOLOGY

## 2018-02-21 PROCEDURE — 85049 AUTOMATED PLATELET COUNT: CPT | Performed by: PHYSICIAN ASSISTANT

## 2018-02-21 PROCEDURE — 25000128 H RX IP 250 OP 636: Performed by: NURSE ANESTHETIST, CERTIFIED REGISTERED

## 2018-02-21 PROCEDURE — 27210794 ZZH OR GENERAL SUPPLY STERILE: Performed by: SURGERY

## 2018-02-21 RX ORDER — HEPARIN SODIUM 5000 [USP'U]/.5ML
5000 INJECTION, SOLUTION INTRAVENOUS; SUBCUTANEOUS
Status: COMPLETED | OUTPATIENT
Start: 2018-02-21 | End: 2018-02-21

## 2018-02-21 RX ORDER — CEFAZOLIN SODIUM 2 G/100ML
2 INJECTION, SOLUTION INTRAVENOUS EVERY 8 HOURS
Status: COMPLETED | OUTPATIENT
Start: 2018-02-21 | End: 2018-02-22

## 2018-02-21 RX ORDER — FENTANYL CITRATE 50 UG/ML
INJECTION, SOLUTION INTRAMUSCULAR; INTRAVENOUS PRN
Status: DISCONTINUED | OUTPATIENT
Start: 2018-02-21 | End: 2018-02-21

## 2018-02-21 RX ORDER — LIDOCAINE 40 MG/G
CREAM TOPICAL
Status: DISCONTINUED | OUTPATIENT
Start: 2018-02-21 | End: 2018-02-23 | Stop reason: HOSPADM

## 2018-02-21 RX ORDER — NICOTINE POLACRILEX 4 MG
15-30 LOZENGE BUCCAL
Status: DISCONTINUED | OUTPATIENT
Start: 2018-02-21 | End: 2018-02-23 | Stop reason: HOSPADM

## 2018-02-21 RX ORDER — ONDANSETRON 2 MG/ML
4 INJECTION INTRAMUSCULAR; INTRAVENOUS EVERY 30 MIN PRN
Status: DISCONTINUED | OUTPATIENT
Start: 2018-02-21 | End: 2018-02-21 | Stop reason: HOSPADM

## 2018-02-21 RX ORDER — SODIUM CHLORIDE, SODIUM LACTATE, POTASSIUM CHLORIDE, CALCIUM CHLORIDE 600; 310; 30; 20 MG/100ML; MG/100ML; MG/100ML; MG/100ML
INJECTION, SOLUTION INTRAVENOUS CONTINUOUS
Status: DISCONTINUED | OUTPATIENT
Start: 2018-02-21 | End: 2018-02-21 | Stop reason: HOSPADM

## 2018-02-21 RX ORDER — PROCHLORPERAZINE MALEATE 5 MG
10 TABLET ORAL EVERY 6 HOURS PRN
Status: DISCONTINUED | OUTPATIENT
Start: 2018-02-21 | End: 2018-02-23 | Stop reason: HOSPADM

## 2018-02-21 RX ORDER — NEOSTIGMINE METHYLSULFATE 1 MG/ML
VIAL (ML) INJECTION PRN
Status: DISCONTINUED | OUTPATIENT
Start: 2018-02-21 | End: 2018-02-21

## 2018-02-21 RX ORDER — NALOXONE HYDROCHLORIDE 0.4 MG/ML
.1-.4 INJECTION, SOLUTION INTRAMUSCULAR; INTRAVENOUS; SUBCUTANEOUS
Status: DISCONTINUED | OUTPATIENT
Start: 2018-02-21 | End: 2018-02-21

## 2018-02-21 RX ORDER — ONDANSETRON 2 MG/ML
4 INJECTION INTRAMUSCULAR; INTRAVENOUS EVERY 6 HOURS PRN
Status: DISCONTINUED | OUTPATIENT
Start: 2018-02-21 | End: 2018-02-23 | Stop reason: HOSPADM

## 2018-02-21 RX ORDER — DIPHENHYDRAMINE HYDROCHLORIDE 50 MG/ML
25 INJECTION INTRAMUSCULAR; INTRAVENOUS EVERY 6 HOURS PRN
Status: DISCONTINUED | OUTPATIENT
Start: 2018-02-21 | End: 2018-02-23 | Stop reason: HOSPADM

## 2018-02-21 RX ORDER — DEXAMETHASONE SODIUM PHOSPHATE 4 MG/ML
INJECTION, SOLUTION INTRA-ARTICULAR; INTRALESIONAL; INTRAMUSCULAR; INTRAVENOUS; SOFT TISSUE PRN
Status: DISCONTINUED | OUTPATIENT
Start: 2018-02-21 | End: 2018-02-21

## 2018-02-21 RX ORDER — ACETAMINOPHEN 325 MG/1
975 TABLET ORAL EVERY 8 HOURS
Status: DISCONTINUED | OUTPATIENT
Start: 2018-02-21 | End: 2018-02-21

## 2018-02-21 RX ORDER — DEXTROSE MONOHYDRATE 25 G/50ML
25-50 INJECTION, SOLUTION INTRAVENOUS
Status: DISCONTINUED | OUTPATIENT
Start: 2018-02-21 | End: 2018-02-23 | Stop reason: HOSPADM

## 2018-02-21 RX ORDER — MAGNESIUM HYDROXIDE 1200 MG/15ML
LIQUID ORAL PRN
Status: DISCONTINUED | OUTPATIENT
Start: 2018-02-21 | End: 2018-02-21 | Stop reason: HOSPADM

## 2018-02-21 RX ORDER — GINSENG 100 MG
CAPSULE ORAL
Status: DISCONTINUED | OUTPATIENT
Start: 2018-02-21 | End: 2018-02-23 | Stop reason: HOSPADM

## 2018-02-21 RX ORDER — NALOXONE HYDROCHLORIDE 0.4 MG/ML
.1-.4 INJECTION, SOLUTION INTRAMUSCULAR; INTRAVENOUS; SUBCUTANEOUS
Status: DISCONTINUED | OUTPATIENT
Start: 2018-02-21 | End: 2018-02-23 | Stop reason: HOSPADM

## 2018-02-21 RX ORDER — VECURONIUM BROMIDE 1 MG/ML
INJECTION, POWDER, LYOPHILIZED, FOR SOLUTION INTRAVENOUS PRN
Status: DISCONTINUED | OUTPATIENT
Start: 2018-02-21 | End: 2018-02-21

## 2018-02-21 RX ORDER — GLYCOPYRROLATE 0.2 MG/ML
INJECTION, SOLUTION INTRAMUSCULAR; INTRAVENOUS PRN
Status: DISCONTINUED | OUTPATIENT
Start: 2018-02-21 | End: 2018-02-21

## 2018-02-21 RX ORDER — BUPIVACAINE HYDROCHLORIDE AND EPINEPHRINE 5; 5 MG/ML; UG/ML
INJECTION, SOLUTION PERINEURAL PRN
Status: DISCONTINUED | OUTPATIENT
Start: 2018-02-21 | End: 2018-02-21 | Stop reason: HOSPADM

## 2018-02-21 RX ORDER — HYDROMORPHONE HYDROCHLORIDE 1 MG/ML
.3-.5 INJECTION, SOLUTION INTRAMUSCULAR; INTRAVENOUS; SUBCUTANEOUS EVERY 5 MIN PRN
Status: DISCONTINUED | OUTPATIENT
Start: 2018-02-21 | End: 2018-02-21 | Stop reason: HOSPADM

## 2018-02-21 RX ORDER — DIPHENHYDRAMINE HCL 25 MG
25 CAPSULE ORAL EVERY 6 HOURS PRN
Status: DISCONTINUED | OUTPATIENT
Start: 2018-02-21 | End: 2018-02-23 | Stop reason: HOSPADM

## 2018-02-21 RX ORDER — PROPOFOL 10 MG/ML
INJECTION, EMULSION INTRAVENOUS PRN
Status: DISCONTINUED | OUTPATIENT
Start: 2018-02-21 | End: 2018-02-21

## 2018-02-21 RX ORDER — FENTANYL CITRATE 50 UG/ML
25-50 INJECTION, SOLUTION INTRAMUSCULAR; INTRAVENOUS
Status: DISCONTINUED | OUTPATIENT
Start: 2018-02-21 | End: 2018-02-21 | Stop reason: HOSPADM

## 2018-02-21 RX ORDER — KETOROLAC TROMETHAMINE 30 MG/ML
30 INJECTION, SOLUTION INTRAMUSCULAR; INTRAVENOUS EVERY 6 HOURS PRN
Status: DISCONTINUED | OUTPATIENT
Start: 2018-02-21 | End: 2018-02-23 | Stop reason: HOSPADM

## 2018-02-21 RX ORDER — LIDOCAINE HYDROCHLORIDE 20 MG/ML
INJECTION, SOLUTION INFILTRATION; PERINEURAL PRN
Status: DISCONTINUED | OUTPATIENT
Start: 2018-02-21 | End: 2018-02-21

## 2018-02-21 RX ORDER — ONDANSETRON 4 MG/1
4 TABLET, ORALLY DISINTEGRATING ORAL EVERY 6 HOURS PRN
Status: DISCONTINUED | OUTPATIENT
Start: 2018-02-21 | End: 2018-02-23 | Stop reason: HOSPADM

## 2018-02-21 RX ORDER — ACETAMINOPHEN 325 MG/1
975 TABLET ORAL EVERY 8 HOURS
Status: DISCONTINUED | OUTPATIENT
Start: 2018-02-22 | End: 2018-02-23 | Stop reason: HOSPADM

## 2018-02-21 RX ORDER — ONDANSETRON 4 MG/1
4 TABLET, ORALLY DISINTEGRATING ORAL EVERY 30 MIN PRN
Status: DISCONTINUED | OUTPATIENT
Start: 2018-02-21 | End: 2018-02-21 | Stop reason: HOSPADM

## 2018-02-21 RX ORDER — CEFAZOLIN SODIUM 1 G/50ML
3 SOLUTION INTRAVENOUS
Status: COMPLETED | OUTPATIENT
Start: 2018-02-21 | End: 2018-02-21

## 2018-02-21 RX ORDER — OXYCODONE HYDROCHLORIDE 5 MG/1
5-10 TABLET ORAL
Status: DISCONTINUED | OUTPATIENT
Start: 2018-02-21 | End: 2018-02-23 | Stop reason: HOSPADM

## 2018-02-21 RX ORDER — SODIUM CHLORIDE, SODIUM LACTATE, POTASSIUM CHLORIDE, CALCIUM CHLORIDE 600; 310; 30; 20 MG/100ML; MG/100ML; MG/100ML; MG/100ML
INJECTION, SOLUTION INTRAVENOUS CONTINUOUS
Status: DISCONTINUED | OUTPATIENT
Start: 2018-02-21 | End: 2018-02-23 | Stop reason: HOSPADM

## 2018-02-21 RX ORDER — ACETAMINOPHEN 325 MG/1
650 TABLET ORAL EVERY 4 HOURS PRN
Status: DISCONTINUED | OUTPATIENT
Start: 2018-02-24 | End: 2018-02-23 | Stop reason: HOSPADM

## 2018-02-21 RX ADMIN — VECURONIUM BROMIDE 2 MG: 1 INJECTION, POWDER, LYOPHILIZED, FOR SOLUTION INTRAVENOUS at 08:28

## 2018-02-21 RX ADMIN — KETOROLAC TROMETHAMINE 30 MG: 30 INJECTION, SOLUTION INTRAMUSCULAR at 13:16

## 2018-02-21 RX ADMIN — FENTANYL CITRATE 100 MCG: 50 INJECTION, SOLUTION INTRAMUSCULAR; INTRAVENOUS at 07:36

## 2018-02-21 RX ADMIN — FAMOTIDINE 20 MG: 10 INJECTION, SOLUTION INTRAVENOUS at 20:47

## 2018-02-21 RX ADMIN — NEOSTIGMINE METHYLSULFATE 5 MG: 1 INJECTION INTRAMUSCULAR; INTRAVENOUS; SUBCUTANEOUS at 09:40

## 2018-02-21 RX ADMIN — DEXAMETHASONE SODIUM PHOSPHATE 4 MG: 4 INJECTION, SOLUTION INTRA-ARTICULAR; INTRALESIONAL; INTRAMUSCULAR; INTRAVENOUS; SOFT TISSUE at 07:53

## 2018-02-21 RX ADMIN — PHENYLEPHRINE HYDROCHLORIDE 100 MCG: 10 INJECTION INTRAVENOUS at 08:52

## 2018-02-21 RX ADMIN — SODIUM CHLORIDE, POTASSIUM CHLORIDE, SODIUM LACTATE AND CALCIUM CHLORIDE: 600; 310; 30; 20 INJECTION, SOLUTION INTRAVENOUS at 12:52

## 2018-02-21 RX ADMIN — PROPOFOL 200 MG: 10 INJECTION, EMULSION INTRAVENOUS at 07:36

## 2018-02-21 RX ADMIN — PROPOFOL 100 MG: 10 INJECTION, EMULSION INTRAVENOUS at 09:54

## 2018-02-21 RX ADMIN — BACITRACIN: 500 OINTMENT TOPICAL at 13:15

## 2018-02-21 RX ADMIN — Medication 3 G: at 08:00

## 2018-02-21 RX ADMIN — VECURONIUM BROMIDE 1 MG: 1 INJECTION, POWDER, LYOPHILIZED, FOR SOLUTION INTRAVENOUS at 09:05

## 2018-02-21 RX ADMIN — HYDROMORPHONE HYDROCHLORIDE 0.5 MG: 1 INJECTION, SOLUTION INTRAMUSCULAR; INTRAVENOUS; SUBCUTANEOUS at 11:02

## 2018-02-21 RX ADMIN — SUCCINYLCHOLINE CHLORIDE 140 MG: 20 INJECTION, SOLUTION INTRAMUSCULAR; INTRAVENOUS; PARENTERAL at 07:36

## 2018-02-21 RX ADMIN — SODIUM CHLORIDE, POTASSIUM CHLORIDE, SODIUM LACTATE AND CALCIUM CHLORIDE: 600; 310; 30; 20 INJECTION, SOLUTION INTRAVENOUS at 09:11

## 2018-02-21 RX ADMIN — FENTANYL CITRATE 50 MCG: 50 INJECTION, SOLUTION INTRAMUSCULAR; INTRAVENOUS at 09:57

## 2018-02-21 RX ADMIN — FENTANYL CITRATE 50 MCG: 50 INJECTION, SOLUTION INTRAMUSCULAR; INTRAVENOUS at 10:06

## 2018-02-21 RX ADMIN — PHENYLEPHRINE HYDROCHLORIDE 100 MCG: 10 INJECTION INTRAVENOUS at 09:03

## 2018-02-21 RX ADMIN — ROCURONIUM BROMIDE 50 MG: 10 INJECTION INTRAVENOUS at 07:48

## 2018-02-21 RX ADMIN — DEXMEDETOMIDINE HYDROCHLORIDE 0.4 MCG/KG/HR: 100 INJECTION, SOLUTION INTRAVENOUS at 07:41

## 2018-02-21 RX ADMIN — KETOROLAC TROMETHAMINE 30 MG: 30 INJECTION, SOLUTION INTRAMUSCULAR at 19:37

## 2018-02-21 RX ADMIN — MIDAZOLAM 2 MG: 1 INJECTION INTRAMUSCULAR; INTRAVENOUS at 07:31

## 2018-02-21 RX ADMIN — LIDOCAINE HYDROCHLORIDE 100 MG: 20 INJECTION, SOLUTION INFILTRATION; PERINEURAL at 07:36

## 2018-02-21 RX ADMIN — PROPOFOL 50 MG: 10 INJECTION, EMULSION INTRAVENOUS at 07:47

## 2018-02-21 RX ADMIN — PHENYLEPHRINE HYDROCHLORIDE 100 MCG: 10 INJECTION INTRAVENOUS at 09:15

## 2018-02-21 RX ADMIN — SODIUM CHLORIDE, POTASSIUM CHLORIDE, SODIUM LACTATE AND CALCIUM CHLORIDE: 600; 310; 30; 20 INJECTION, SOLUTION INTRAVENOUS at 20:30

## 2018-02-21 RX ADMIN — HYDROMORPHONE HYDROCHLORIDE 0.5 MG: 1 INJECTION, SOLUTION INTRAMUSCULAR; INTRAVENOUS; SUBCUTANEOUS at 08:09

## 2018-02-21 RX ADMIN — VECURONIUM BROMIDE 2 MG: 1 INJECTION, POWDER, LYOPHILIZED, FOR SOLUTION INTRAVENOUS at 08:04

## 2018-02-21 RX ADMIN — SODIUM CHLORIDE, POTASSIUM CHLORIDE, SODIUM LACTATE AND CALCIUM CHLORIDE: 600; 310; 30; 20 INJECTION, SOLUTION INTRAVENOUS at 08:17

## 2018-02-21 RX ADMIN — CEFAZOLIN SODIUM 2 G: 2 INJECTION, SOLUTION INTRAVENOUS at 16:06

## 2018-02-21 RX ADMIN — PHENYLEPHRINE HYDROCHLORIDE 50 MCG: 10 INJECTION INTRAVENOUS at 08:00

## 2018-02-21 RX ADMIN — LIDOCAINE HYDROCHLORIDE 2 ML: 10 INJECTION, SOLUTION EPIDURAL; INFILTRATION; INTRACAUDAL; PERINEURAL at 06:54

## 2018-02-21 RX ADMIN — HEPARIN SODIUM 5000 UNITS: 10000 INJECTION, SOLUTION INTRAVENOUS; SUBCUTANEOUS at 07:45

## 2018-02-21 RX ADMIN — VECURONIUM BROMIDE 1 MG: 1 INJECTION, POWDER, LYOPHILIZED, FOR SOLUTION INTRAVENOUS at 09:16

## 2018-02-21 RX ADMIN — HYDROMORPHONE HYDROCHLORIDE 0.5 MG: 1 INJECTION, SOLUTION INTRAMUSCULAR; INTRAVENOUS; SUBCUTANEOUS at 10:37

## 2018-02-21 RX ADMIN — HYDROMORPHONE HYDROCHLORIDE: 10 INJECTION, SOLUTION INTRAMUSCULAR; INTRAVENOUS; SUBCUTANEOUS at 11:05

## 2018-02-21 RX ADMIN — FAMOTIDINE 20 MG: 10 INJECTION, SOLUTION INTRAVENOUS at 13:15

## 2018-02-21 RX ADMIN — VECURONIUM BROMIDE 1 MG: 1 INJECTION, POWDER, LYOPHILIZED, FOR SOLUTION INTRAVENOUS at 08:52

## 2018-02-21 RX ADMIN — SODIUM CHLORIDE, POTASSIUM CHLORIDE, SODIUM LACTATE AND CALCIUM CHLORIDE: 600; 310; 30; 20 INJECTION, SOLUTION INTRAVENOUS at 06:54

## 2018-02-21 RX ADMIN — PHENYLEPHRINE HYDROCHLORIDE 100 MCG: 10 INJECTION INTRAVENOUS at 08:46

## 2018-02-21 RX ADMIN — GLYCOPYRROLATE 1 MG: 0.2 INJECTION, SOLUTION INTRAMUSCULAR; INTRAVENOUS at 09:40

## 2018-02-21 ASSESSMENT — ENCOUNTER SYMPTOMS: SEIZURES: 0

## 2018-02-21 ASSESSMENT — COPD QUESTIONNAIRES: COPD: 0

## 2018-02-21 NOTE — IP AVS SNAPSHOT
[facility] [address] [facility]    [address] [place] [address]    Phone:  [phone]                                       After Visit Summary   2/21/2018    Edelmira Sharp    MRN: [id number]           After Visit Summary Signature Page     I have received my discharge instructions, and my questions have been answered. I have discussed any challenges I see with this plan with the nurse or doctor.    ..........................................................................................................................................  Patient/Patient Representative Signature      ..........................................................................................................................................  Patient Representative Print Name and Relationship to Patient    ..................................................               ................................................  Date                                            Time    ..........................................................................................................................................  Reviewed by Signature/Title    ...................................................              ..............................................  Date                                                            Time

## 2018-02-21 NOTE — ANESTHESIA POSTPROCEDURE EVALUATION
Patient: Edelmira Sharp    Procedure(s):  LAPAROSCOPIC REY EN Y GASTRIC BYPASS  - Wound Class: II-Clean Contaminated    Diagnosis:morbid obesity   Diagnosis Additional Information: No value filed.    Anesthesia Type:  General, ETT    Note:  Anesthesia Post Evaluation    Patient location during evaluation: PACU  Patient participation: Able to fully participate in evaluation  Level of consciousness: awake, awake and alert and responsive to verbal stimuli  Pain management: adequate  Airway patency: patent  Cardiovascular status: acceptable  Respiratory status: acceptable  Hydration status: acceptable  PONV: none     Anesthetic complications: None          Last vitals:  Vitals:    02/21/18 1300 02/21/18 1316 02/21/18 1400   BP: 112/67  114/64   Pulse:   72   Resp: 14 16 16   Temp:      SpO2: 96%  96%         Electronically Signed By: Celeste Leos  February 21, 2018  3:15 PM

## 2018-02-21 NOTE — PROGRESS NOTES
Admission medication history interview status for the 2/21/2018  admission is complete. See EPIC admission navigator for prior to admission medications     Medication history source reliability:Good    Medication history interview source(s):Patient    Medication history resources (including written lists, pill bottles, clinic record):None    Primary pharmacy.[facility]    Additional medication history information not noted on PTA med list :None    Time spent in this activity: 45 minutes    Prior to Admission medications    Medication Sig Last Dose Taking? Auth Provider   METFORMIN HCL PO Take 500 mg by mouth 2 times daily as needed (based on diet for the day) 2/19/2018 Yes Reported, Patient

## 2018-02-21 NOTE — BRIEF OP NOTE
[facility] Brief Operative Note    Pre-operative diagnosis: morbid obesity    Post-operative diagnosis Morbid Obesity with comorbidities including DM II, and hyperlipidemia   Procedure: Procedure(s):  LAPAROSCOPIC REY EN Y GASTRIC BYPASS  - Wound Class: II-Clean Contaminated   Surgeon(s): Surgeon(s) and Role:     * Niels Jang MD - Primary     * Fernandez Quach PA-C - Assisting     * MIGUEL Rainey - Assisting   Estimated blood loss: 30 mL    Specimens: * No specimens in log *   Findings: Gallbladder wnl  Some omental adhesions to RLQ that were left undisturbed  See Operative Report for full details.  No complications noted.

## 2018-02-21 NOTE — IP AVS SNAPSHOT
MRN:[id number]                      After Visit Summary   2/21/2018    Edelmira Sharp    MRN: [id number]           Thank you!     Thank you for choosing [facility] for your care. Our goal is always to provide you with excellent care. Hearing back from our patients is one way we can continue to improve our services. Please take a few minutes to complete the written survey that you may receive in the mail after you visit with us. Thank you!        Patient Information     Date Of Birth          1975        Designated Caregiver       Most Recent Value    Caregiver    Will someone help with your care after discharge? yes    Name of designated caregiver prashant    Phone number of caregiver     Caregiver address [place]      About your hospital stay     You were admitted on:  February 21, 2018 You last received care in the:  [place] [address] [facility]    You were discharged on:  February 23, 2018        Reason for your hospital stay       Bariatric Surgery                  Who to Call     For medical emergencies, please call 911.  For non-urgent questions about your medical care, please call your primary care provider or clinic, [phone]  For questions related to your surgery, please call your surgery clinic        Attending Provider     Provider Specialty    Niels Jang MD General Surgery       Primary Care Provider Office Phone # Fax #    Smita Montoya [phone]       When to contact your care team       Call [facility] if you have any of the following: fever, dehydration, or increased pain that lasts longer than 2 hours and not improved with rest.                  After Care Instructions     Activity       Your activity upon discharge: activity as tolerated, ambulate in house, no driving while on analgesics and no heavy lifting for 2-3 weeks            Diet       Follow this diet upon discharge: Full liquid.  If full liquids are not  tolerated, go back to Clear liquids for 24-48 hours and then try Full liquids again.            Discharge Instructions       Use your incentive spirometer every 2-3 hours at home for there first few days.  Think of this as PT for your lungs.  Until your abdominal pain is gone, your lungs will try to resist being used at their full capacity.  The spirometer helps with this and helps you avoid a pneumonia.            Wound care and dressings       Instructions to care for your wound at home: may get incision wet in shower but do not soak or scrub.                  Follow-up Appointments     Follow-up and recommended labs and tests       Follow up with [facility] Clinic within 1 week  to evaluate after surgery. No follow up labs or test are needed.                  Your next 10 appointments already scheduled     Feb 28, 2018 10:40 AM CST   Post Op with Wendy Wilson Rn, RN   [facility] ([facility])    [address] [place] [id number]   [phone]            Feb 28, 2018 11:00 AM CST   Post Op with Alma Beaulieu PA-C   [facility] [place])    [address] [place] [address]   [phone]            Mar 07, 2018 10:00 AM CST   Post Op with Wendy Wilson Rn, RN   [facility] ([facility] [place])    [address] [place] [address]   [phone]            Mar 07, 2018 10:30 AM CST   Post Op with Wendy Wilson Diet 3, RD   [facility] ([facility])    [address] [place] [address]   [phone]            Mar 26, 2018  9:00 AM CDT   Post Op with Wendy Wilson Diet 1, RD   [facility] ([facility] [place])    [address] [place] [address]  [id number] [phone]              Further instructions from your care team            For informational purposes only. Not to replace the advice of your health care provider. Copyright   2006 [facility]. All rights reserved. Dandelion [id number] - REV 09/14  After Bariatric Surgery  [facility]  Note: Before you go home, ask your nurse to order your pain medicine from the pharmacy. Be sure you have your medicine with you when you leave.  How much fluid should I drink?    Drink at least 1 ounce of fluid every 15 minutes (1/2 cup per hour) during the day.     Carry a water bottle with you. Drink from it often.    Keep track of how much fluid you drink in a day.    Adjustable stomach band: Do not overeat or drink too much. This can cause vomiting (throwing up), stretch your stomach, or make your stomach slip up over your band.    Remember:  - Do not use straws, chew gum or suck on hard candies. They may cause painful gas.   - No cold drinks.  - No coffee, soda pop or drinks with caffeine. These may cause stomach pain.  - No alcohol. It is bad for your liver and will cause stomach pain. It also adds a lot of calories.  What can I do for pain control?      You had major abdominal surgery that involves all layers of your abdominal muscles.   Pain is expected, even as far out as 6-8 weeks postop.  Moving, sneezing, coughing, and  breathing will cause pain because these activities use your abdominal muscles.      You can take the liquid pain medicine as prescribed. You can also try to wean off from it  as soon as you feel comfortable.  Do not drive while you are taking pain medicine.   This is dangerous.     You may take liquid Tylenol (acetaminophen) for pain in place of the prescribed pain  medicine. Do not take more than 3000 mg in a 24 hour period.     You may also apply ice or heat to the affected area(s).  Just remember to wrap the ice  in something and limit icing sessions to 20  minutes. Excessive icing can irritate the skin  or cause tissue damage.   You can apply heat with a hot, wet towel or heating pad. Just like cold therapy, limit  heat application to 20 minutes. Never sleep with a heating pad on. It could cause severe  burns to your skin.    Do not take NSAID s (ibuprofen, Motrin, Advil, Aleve, Naproxen). They will increase you risk for bleeding or getting an ulcer.    If you have any of the following pain issues, we would like to talk to you:  - pain that does not improve with rest  - pain that gets worse and worse  - pain that is not controlled by your pain medicine  - a sudden severe increase in pain  -   If your doctor prescribes Zantac, take it as ordered. Take it for 3 months to prevent       Ulcers.  -   If you took an antacid before you had surgery, keep taking it for 3 months after           surgery. This will help prevent ulcers.   - Take any other medicines that your doctor tells you to take.   - Wear your binder to support your belly muscles.  Please call the clinic at [phone] for any concerns      How much rest do I need?  Get plenty of rest the first few days after surgery. Balance rest and activity.  Do not nap more than one hour during the day. Set a timer to wake yourself up, if needed. Too much sleep will keep you from drinking enough fluid during the day.  What should I know about my incisions (cuts)?  - Change your bandages as needed or if they get wet.   -Call your doctor if you have any of these signs of infection:   - Redness around the site.   - Drainage that smells bad.   - Fever of 101  F (38.3  C) or higher when taken under the tongue.- Chills.  If you     have a drain:  - Do not pull on the drain. Do not pull the drain out. We will take out your drain at your first clinic visit.  - The color and amount of fluid varies. Right after surgery the fluid is bright red. Over time, it changes to light pink and may become clear or the color of straw.   Will my  urine or bowel movements change?   You might not have a bowel movement for several days after surgery. Your first bowel movements will likely be liquid.   Gastric bypass or sleeve gastrectomy: You may also notice old blood or a darker color in your stools (bowel movements).   Your urine should be clear to light yellow. This shows that you are drinking enough fluid. You should urinate (pass water) at least 2 to 3 times during the day. If not, call us.  What kind of activity is safe?  For 4 weeks after surgery:     Walk for a short time every day.    Do not jog or run.    No weight lifting or belly exercises.  No swimming, baths or hot tubs until your cuts are healed (scabs are gone). You may shower.  No outside activity in hot, humid weather until you can drink 48 to 64 ounces of fluid in 24 hours. If you sweat a lot, your body may lose too much water.   The first month after surgery, do not take any trips where you must sit for a long time. You could get a blood clot in your legs.  Call the clinic at [phone] if:    Your pain medicine is not working.    You do not urinate (pass water) 2 to 3 times per day.    You have any signs of infection.    You have belly pain that gets worse and worse.    You have swollen legs with pain behind the knee or calf.    You have chest pain or feel very short of breath.    You have any questions or concerns.    You have a sudden severe increase in heart rate.    You have vomiting that gets worse and worse.  When should I go back to the clinic?  Time Gastric bypass or sleeve gastrectomy Adjustable gastric band   Week 1 See the physician or physician assistant (PA). See the nurse and physical therapist.   Week 2 See the nurse and dietitian. See the nurse and dietitian.   Week 4 Have a nutrition consult with the dietitian. See the PA and dietitian.   Week 6  Go for the first adjustment (fill) of your stomach band.   For the first year (or until your BMI is less than 30) Go to the  "clinic each month to see if you need a band adjustment (fill).         Pending Results     No orders found from 2/19/2018 to 2/22/2018.            Admission Information     Date & Time Provider Department Dept. Phone    2/21/2018 Niels Jang MD [place] [address] [facility] [phone]      Your Vitals Were     Blood Pressure Pulse Temperature Respirations Height Weight    134/85 (BP Location: Right arm) 84 98.6  F (37  C) (Oral) 16 1.803 m (5' 11\") 125 kg (275 lb 9 oz)    Pulse Oximetry BMI (Body Mass Index)                96% 38.43 kg/m2          MyChart Information     [employer] gives you secure access to your electronic health record. If you see a primary care provider, you can also send messages to your care team and make appointments. If you have questions, please call your primary care clinic.  If you do not have a primary care provider, please call [phone] and they will assist you.        Care EveryWhere ID     This is your Care EveryWhere ID. This could be used by other organizations to access your [facility] medical records  [id number]        Equal Access to Services     INGRIS CARRERA : Hadrajiv Amado, zenon cody, justyna cabrera, ivan gomez. So [facility] [phone].    ATENCIÓN: Si habla español, tiene a alcazar disposición servicios gratuitos de asistencia lingüística. [facility] [phone].    We comply with applicable federal civil rights laws and [place] laws. We do not discriminate on the basis of race, color, national origin, age, disability, sex, sexual orientation, or gender identity.               Review of your medicines      START taking        Dose / Directions    oxyCODONE IR 5 MG tablet   Commonly known as:  ROXICODONE   Used for:  Bariatric surgery status, Acute post-operative pain        Dose:  5-10 mg   Take 1-2 tablets (5-10 mg) by mouth every 3 hours as needed for other (pain control or improvement in " physical function. Hold dose for analgesic side effects.)   Quantity:  30 tablet   Refills:  0       ranitidine 75 MG tablet   Commonly known as:  ZANTAC   Used for:  Bariatric surgery status        Dose:  75 mg   Take 1 tablet (75 mg) by mouth 2 times daily   Quantity:  60 tablet   Refills:  2         STOP taking     METFORMIN HCL PO                Where to get your medicines      These medications were sent to [facility] [place], [place] - [address]  [address], [place] [address]     Phone:  [phone]     ranitidine 75 MG tablet         Some of these will need a paper prescription and others can be bought over the counter. Ask your nurse if you have questions.     Bring a paper prescription for each of these medications     oxyCODONE IR 5 MG tablet                Protect others around you: Learn how to safely use, store and throw away your medicines at www.disposemymeds.org.        Information about OPIOIDS     PRESCRIPTION OPIOIDS: WHAT YOU NEED TO KNOW    Prescription opioids can be used to help relieve moderate to severe pain and are often prescribed following a surgery or injury, or for certain health conditions. These medications can be an important part of treatment but also come with serious risks. It is important to work with your health care provider to make sure you are getting the safest, most effective care.    WHAT ARE THE RISKS AND SIDE EFFECTS OF OPIOID USE?  Prescription opioids carry serious risks of addiction and overdose, especially with prolonged use. An opioid overdose, often marked by slowed breathing can cause sudden death. The use of prescription opioids can have a number of side effects as well, even when taken as directed:      Tolerance - meaning you might need to take more of a medication for the same pain relief    Physical dependence - meaning you have symptoms of withdrawal when a medication is stopped    Increased sensitivity to  pain    Constipation    Nausea, vomiting, and dry mouth    Sleepiness and dizziness    Confusion    Depression    Low levels of testosterone that can result in lower sex drive, energy, and strength    Itching and sweating    RISKS ARE GREATER WITH:    History of drug misuse, substance use disorder, or overdose    Mental health conditions (such as depression or anxiety)    Sleep apnea    Older age (65 years or older)    Pregnancy    Avoid alcohol while taking prescription opioids.   Also, unless specifically advised by your health care provider, medications to avoid include:    Benzodiazepines (such as Xanax or Valium)    Muscle relaxants (such as Soma or Flexeril)    Hypnotics (such as Ambien or Lunesta)    Other prescription opioids    KNOW YOUR OPTIONS:  Talk to your health care provider about ways to manage your pain that do not involve prescription opioids. Some of these options may actually work better and have fewer risks and side effects:    Pain relievers such as acetaminophen, ibuprofen, and naproxen    Some medications that are also used for depression or seizures    Physical therapy and exercise    Cognitive behavioral therapy, a psychological, goal-directed approach, in which patients learn how to modify physical, behavioral, and emotional triggers of pain and stress    IF YOU ARE PRESCRIBED OPIOIDS FOR PAIN:    Never take opioids in greater amounts or more often than prescribed    Follow up with your primary health care provider and work together to create a plan on how to manage your pain.    Talk about ways to help manage your pain that do not involve prescription opioids    Talk about all concerns and side effects    Help prevent misuse and abuse    Never sell or share prescription opioids    Never use another person's prescription opioids    Store prescription opioids in a secure place and out of reach of others (this may include visitors, children, friends, and family)    Visit  www.cdc.gov/drugoverdose to learn about risks of opioid abuse and overdose    If you believe you may be struggling with addiction, tell your health care provider and ask for guidance or call [facility]'s National Helpline at 1-525-941-HELP    LEARN MORE / www.cdc.gov/drugoverdose/prescribing/guideline.html    Safely dispose of unused prescription opioids: Find your local drug take-back programs and more information about the importance of safe disposal at www.doseofreality.[place].gov             Medication List: This is a list of all your medications and when to take them. Check marks below indicate your daily home schedule. Keep this list as a reference.      Medications           Morning Afternoon Evening Bedtime As Needed    oxyCODONE IR 5 MG tablet   Commonly known as:  ROXICODONE   Take 1-2 tablets (5-10 mg) by mouth every 3 hours as needed for other (pain control or improvement in physical function. Hold dose for analgesic side effects.)   Last time this was given:  10 mg on 2/23/2018  8:56 AM                                   ranitidine 75 MG tablet   Commonly known as:  ZANTAC   Take 1 tablet (75 mg) by mouth 2 times daily   Last time this was given:  75 mg on 2/23/2018  8:55 AM

## 2018-02-21 NOTE — OP NOTE
PREOPERATIVE DIAGNOSIS: Morbid obesity with  medical comorbidities.   POSTOPERATIVE DIAGNOSIS: Morbid obesity with  medical comorbidities.   PROCEDURE: Laparoscopic Muaro-en-Y gastric bypass.   SURGEON: Niels Jang MD   ASSISTANT: Fernandez Quach PA-C, Physician's assistant first assistant was necessary during the performance of this procedure for expertise in patient positioning, prepping, draping, trocar placement, camera management, retraction and exposure, and suctioning.  ANESTHESIA: General endotracheal.   ESTIMATED BLOOD LOSS: 30 cc.   DRAINS: None.   COMPLICATIONS: None.   SPECIMENS: None.   OPERATIVE INDICATIONS: Edelmira Sharp has undergone the preoperative screening process through the [facility] and has been deemed an appropriate candidate for bariatric surgery. He  was furnished with a copy of our specialized consent form for said procedure. This document was reviewed with him  in great detail. After thoroughly discussing the procedures, potential risks and anticipated outcome he  wished to proceed.   DESCRIPTION OF PROCEDURE: After informed consent was obtained, the patient was taken to the operating room and placed supine on the operating table. Following the induction of adequate general endotracheal anesthesia, a Riley catheter was placed and the patient's abdomen was shaved, prepped and draped in the usual fashion. A surgical timeout was initiated and completed. Appropriate IV antibiotics as well as subq heparin were administered.Physician's  Assistant was necessary for the case to assist in prepping, positioning, camera operation, retraction/exposure, and closure of port sites. Skin incision was then made to the left of the umbilicus in the mid abdomen and a 12 mm optical trocar was used to gain access to the peritoneal cavity. Carbon dioxide pneumoperitoneum was then established. Under direct vision, the following ports were then placed: a 12 mm port was placed in the  left anterior axillary line, 5 mm port was placed in the left subcostal position, a 12 mm port was placed in the right subcostal position and finally, a 5 mm port was placed to the left of midline in the upper abdomen. The greater omentum was then divided from its distal tip to the level of the transverse colon. The small bowel was run proximally to the ligament of Treitz. 50 cm distal to ligament of Treitz, the small bowel was divided using an endoscopic linear stapler, 60 mm blue load prepped with Klarissa-Strips Dry. 125 cm bypass Mauro limb was measured out and a side-to-side jejunojejunostomy was created using the 60 mm blue load stapler prepped with Klarissa-Strips Dry. Enterotomies were oversewn using running 2-0 silk suture. The mesenteric defect was closed using running 2-0 silk suture. The patient was placed in reverse Trendelenburg and under direct vision, a Clay liver retractor was introduced through a subxiphoid stab incision used to elevate the left lobe of the liver. The angle of His was taken down and dissection was carried out along the lesser curvature of the stomach, gaining access to her access to the retrogastric space. A gastrotomy was then created and the anvil portion of the 25 EEA stapler was introduced into the abdomen and placed in the stomach. This was deployed through the anterior gastric wall in appropriate location and the gastrotomy was closed using interrupted 2-0 silk sutures. A completely divided gastric pouch was then created around the anvil using several firings of the 60 mm blue load stapler prepped with Klarissa-Strips Dry. A completely divided pouch was assured. The bypass Mauro limb was brought up in an antecolic antegastric fashion. The stapled portion was opened and the firing portion of the 25 EEA stapler was advanced into the bowel. This was deployed through the antemesenteric border of the bowel. The anvil was attached to the stapler and the stapler was closed and fired without  complication. Stapler was then removed and the Mauro limb was reclosed using a 60 mm blue load stapler prepped with Klarissa-Strips Dry. The GJ anastomosis was oversewn anteriorly using interrupted 2-0 Vicryl suture. A bubble test was performed and this was negative. The gallbladder was examined and appeared normal. The fascia at the 12 mm port in the left anterior axillary line was closed using a fascial closure instrument and 0 Vicryl tie. Trocars as well as Clay retractor were all then removed. Carbon dioxide was massaged from the abdomen. Local anesthetic was injected at the incision sites and the incisions were all closed with subcuticular 4-0 Vicryl stitches. Benzoin and Steri-Strips were applied. Needle and sponge counts were correct. The patient tolerated this without difficulty, was awakened in the operating room and taken to the recovery room in stable condition.   RONNIE CABRERA MD

## 2018-02-21 NOTE — ANESTHESIA CARE TRANSFER NOTE
Patient: Edelmira Sharp    Procedure(s):  LAPAROSCOPIC REY EN Y GASTRIC BYPASS  - Wound Class: II-Clean Contaminated    Diagnosis: morbid obesity   Diagnosis Additional Information: No value filed.    Anesthesia Type:   General, ETT     Note:  Airway :Face Mask  Patient transferred to:PACU  Comments: Patient spontaneously breathing and following commands. VSS. Report given to RN.Handoff Report: Identifed the Patient, Identified the Reponsible Provider, Reviewed the pertinent medical history, Discussed the surgical course, Reviewed Intra-OP anesthesia mangement and issues during anesthesia, Set expectations for post-procedure period and Allowed opportunity for questions and acknowledgement of understanding      Vitals: (Last set prior to Anesthesia Care Transfer)    CRNA VITALS  2/21/2018 0937 - 2/21/2018 1013      2/21/2018             Pulse: 82    SpO2: 98 %    Resp Rate (set): 10                Electronically Signed By: NESSA Carmen CRNA  February 21, 2018  10:13 AM

## 2018-02-21 NOTE — ANESTHESIA PREPROCEDURE EVALUATION
Procedure: Procedure(s):  COMBINED LAPAROSCOPIC BYPASS GASTRIC, CHOLECYSTECTOMY  Preop diagnosis: morbid obesity     Not on File  Past Medical History:   Diagnosis Date     ACL (anterior cruciate ligament) tear 3/13/2014     CARDIOVASCULAR SCREENING; LDL GOAL LESS THAN 160 3/13/2014     Diabetes (H)      Hip pain 3/13/2014     Hyperlipidemia LDL goal <160 3/13/2014     Labral tear of hip, degenerative      Lumbago      Obese      Right knee pain 3/13/2014     Past Surgical History:   Procedure Laterality Date     ABDOMEN SURGERY  1985     ORTHOPEDIC SURGERY Right 1996    ACL     Social History   Substance Use Topics     Smoking status: Never Smoker     Smokeless tobacco: Never Used     Alcohol use No     Prior to Admission medications    Medication Sig Start Date End Date Taking? Authorizing Provider   METFORMIN HCL PO Take 500 mg by mouth 2 times daily as needed (based on diet for the day)   Yes Reported, Patient     Current Facility-Administered Medications Ordered in Epic   Medication Dose Route Frequency Last Rate Last Dose     heparin sodium PF injection 5,000 Units  5,000 Units Subcutaneous Pre-Op/Pre-procedure x 1 dose         ceFAZolin (ANCEF) intermittent infusion 3 g (pre-mix)  3 g Intravenous Pre-Op/Pre-procedure x 1 dose         lidocaine 1 % 1 mL  1 mL Other Q1H PRN         lactated ringers infusion   Intravenous Continuous         No current [facility]-ordered outpatient prescriptions on file.       lactated ringers       Wt Readings from Last 1 Encounters:   02/21/18 125 kg (275 lb 9 oz)     Temp Readings from Last 1 Encounters:   02/21/18 36.3  C (97.4  F) (Temporal)     BP Readings from Last 6 Encounters:   02/21/18 116/76   11/16/17 105/45   10/17/17 108/69   01/17/17 108/62   10/27/14 130/80   03/13/14 121/78     Pulse Readings from Last 4 Encounters:   11/16/17 97   10/17/17 67   01/17/17 70   03/13/14 73     Resp Readings from Last 1 Encounters:   02/21/18 16     SpO2 Readings from Last 1 Encounters:    02/21/18 96%     Recent Labs   Lab Test  01/17/17   1540  03/13/14   1359   NA  138  142   POTASSIUM  4.0  4.4   CHLORIDE  103  101   CO2  28  28   ANIONGAP  7  13   GLC  92  106*   BUN  11  12   CR  0.84  0.89   JONI  9.3  9.9     Recent Labs   Lab Test  01/17/17   1540   AST  22   ALT  33   ALKPHOS  69   BILITOTAL  0.5     Recent Labs   Lab Test  01/17/17   1540   WBC  10.1   HGB  14.9   PLT  270         Anesthesia Evaluation     .             ROS/MED HX    ENT/Pulmonary:      (-) asthma, COPD and sleep apnea   Neurologic:      (-) seizures and CVA   Cardiovascular: Comment: ECHO:   EF 55-60%      (+) Dyslipidemia, ----. : . . . :. .       METS/Exercise Tolerance:     Hematologic:         Musculoskeletal:         GI/Hepatic:     (+) Other GI/Hepatic gastric bypass      Renal/Genitourinary:         Endo:     (+) type II DM Obesity, .      Psychiatric:         Infectious Disease:         Malignancy:         Other:                     Physical Exam  Normal systems: dental    Airway   Mallampati: II  TM distance: >3 FB  Neck ROM: full    Dental     Cardiovascular   Rhythm and rate: regular      Pulmonary    breath sounds clear to auscultation                    Anesthesia Plan      History & Physical Review  History and physical reviewed and following examination; no interval change.    ASA Status:  2 .    NPO Status:  > 8 hours    Plan for General and ETT   PONV prophylaxis:  Ondansetron (or other 5HT-3) and Dexamethasone or Solumedrol  Additional equipment: Videolaryngoscope precedex gtt      Postoperative Care      Consents  Anesthetic plan, risks, benefits and alternatives discussed with:  Patient..                          .

## 2018-02-22 ENCOUNTER — APPOINTMENT (OUTPATIENT)
Dept: GENERAL RADIOLOGY | Facility: CLINIC | Age: 43
DRG: 621 | End: 2018-02-22
Attending: PHYSICIAN ASSISTANT
Payer: COMMERCIAL

## 2018-02-22 LAB
ANION GAP SERPL CALCULATED.3IONS-SCNC: 6 MMOL/L (ref 3–14)
CHLORIDE SERPL-SCNC: 103 MMOL/L (ref 94–109)
CO2 SERPL-SCNC: 29 MMOL/L (ref 20–32)
GLUCOSE BLDC GLUCOMTR-MCNC: 107 MG/DL (ref 70–99)
GLUCOSE BLDC GLUCOMTR-MCNC: 109 MG/DL (ref 70–99)
GLUCOSE BLDC GLUCOMTR-MCNC: 113 MG/DL (ref 70–99)
GLUCOSE BLDC GLUCOMTR-MCNC: 88 MG/DL (ref 70–99)
GLUCOSE BLDC GLUCOMTR-MCNC: 92 MG/DL (ref 70–99)
GLUCOSE SERPL-MCNC: 100 MG/DL (ref 70–99)
HGB BLD-MCNC: 12.6 G/DL (ref 13.3–17.7)
POTASSIUM SERPL-SCNC: 3.9 MMOL/L (ref 3.4–5.3)
SODIUM SERPL-SCNC: 138 MMOL/L (ref 133–144)

## 2018-02-22 PROCEDURE — 85018 HEMOGLOBIN: CPT | Performed by: PHYSICIAN ASSISTANT

## 2018-02-22 PROCEDURE — 40000986 XR UPPER GI WATER SOLUBLE

## 2018-02-22 PROCEDURE — 36415 COLL VENOUS BLD VENIPUNCTURE: CPT | Performed by: PHYSICIAN ASSISTANT

## 2018-02-22 PROCEDURE — 25000125 ZZHC RX 250: Performed by: PHYSICIAN ASSISTANT

## 2018-02-22 PROCEDURE — 25000132 ZZH RX MED GY IP 250 OP 250 PS 637: Performed by: SURGERY

## 2018-02-22 PROCEDURE — 00000146 ZZHCL STATISTIC GLUCOSE BY METER IP

## 2018-02-22 PROCEDURE — 80051 ELECTROLYTE PANEL: CPT | Performed by: PHYSICIAN ASSISTANT

## 2018-02-22 PROCEDURE — 82947 ASSAY GLUCOSE BLOOD QUANT: CPT | Performed by: PHYSICIAN ASSISTANT

## 2018-02-22 PROCEDURE — 25000128 H RX IP 250 OP 636: Performed by: PHYSICIAN ASSISTANT

## 2018-02-22 PROCEDURE — 25000132 ZZH RX MED GY IP 250 OP 250 PS 637: Performed by: PHYSICIAN ASSISTANT

## 2018-02-22 PROCEDURE — 82947 ASSAY GLUCOSE BLOOD QUANT: CPT | Performed by: SURGERY

## 2018-02-22 PROCEDURE — 12000007 ZZH R&B INTERMEDIATE

## 2018-02-22 RX ORDER — SIMETHICONE 80 MG
80 TABLET,CHEWABLE ORAL EVERY 6 HOURS PRN
Status: DISCONTINUED | OUTPATIENT
Start: 2018-02-22 | End: 2018-02-23 | Stop reason: HOSPADM

## 2018-02-22 RX ADMIN — OXYCODONE HYDROCHLORIDE 10 MG: 5 TABLET ORAL at 13:45

## 2018-02-22 RX ADMIN — FAMOTIDINE 20 MG: 10 INJECTION, SOLUTION INTRAVENOUS at 20:30

## 2018-02-22 RX ADMIN — HYDROMORPHONE HYDROCHLORIDE: 10 INJECTION, SOLUTION INTRAMUSCULAR; INTRAVENOUS; SUBCUTANEOUS at 06:46

## 2018-02-22 RX ADMIN — ONDANSETRON 4 MG: 2 INJECTION INTRAMUSCULAR; INTRAVENOUS at 16:56

## 2018-02-22 RX ADMIN — KETOROLAC TROMETHAMINE 30 MG: 30 INJECTION, SOLUTION INTRAMUSCULAR at 07:24

## 2018-02-22 RX ADMIN — OXYCODONE HYDROCHLORIDE 10 MG: 5 TABLET ORAL at 16:47

## 2018-02-22 RX ADMIN — SODIUM CHLORIDE, POTASSIUM CHLORIDE, SODIUM LACTATE AND CALCIUM CHLORIDE: 600; 310; 30; 20 INJECTION, SOLUTION INTRAVENOUS at 22:14

## 2018-02-22 RX ADMIN — OXYCODONE HYDROCHLORIDE 5 MG: 5 TABLET ORAL at 10:23

## 2018-02-22 RX ADMIN — ENOXAPARIN SODIUM 40 MG: 40 INJECTION SUBCUTANEOUS at 20:30

## 2018-02-22 RX ADMIN — FAMOTIDINE 20 MG: 10 INJECTION, SOLUTION INTRAVENOUS at 10:23

## 2018-02-22 RX ADMIN — ONDANSETRON 4 MG: 4 TABLET, ORALLY DISINTEGRATING ORAL at 10:23

## 2018-02-22 RX ADMIN — OXYCODONE HYDROCHLORIDE 10 MG: 5 TABLET ORAL at 20:30

## 2018-02-22 RX ADMIN — ENOXAPARIN SODIUM 40 MG: 40 INJECTION SUBCUTANEOUS at 10:24

## 2018-02-22 RX ADMIN — ACETAMINOPHEN 975 MG: 325 TABLET, FILM COATED ORAL at 14:13

## 2018-02-22 RX ADMIN — Medication: at 12:00

## 2018-02-22 RX ADMIN — SIMETHICONE CHEW TAB 80 MG 80 MG: 80 TABLET ORAL at 10:23

## 2018-02-22 RX ADMIN — CEFAZOLIN SODIUM 2 G: 2 INJECTION, SOLUTION INTRAVENOUS at 00:13

## 2018-02-22 RX ADMIN — SODIUM CHLORIDE, POTASSIUM CHLORIDE, SODIUM LACTATE AND CALCIUM CHLORIDE: 600; 310; 30; 20 INJECTION, SOLUTION INTRAVENOUS at 16:56

## 2018-02-22 RX ADMIN — KETOROLAC TROMETHAMINE 30 MG: 30 INJECTION, SOLUTION INTRAMUSCULAR at 01:26

## 2018-02-22 RX ADMIN — SODIUM CHLORIDE, POTASSIUM CHLORIDE, SODIUM LACTATE AND CALCIUM CHLORIDE: 600; 310; 30; 20 INJECTION, SOLUTION INTRAVENOUS at 03:09

## 2018-02-22 RX ADMIN — DIATRIZOATE MEGLUMINE AND DIATRIZOATE SODIUM 60 ML: 660; 100 SOLUTION ORAL; RECTAL at 08:30

## 2018-02-22 RX ADMIN — HYDROMORPHONE HYDROCHLORIDE: 10 INJECTION, SOLUTION INTRAMUSCULAR; INTRAVENOUS; SUBCUTANEOUS at 10:35

## 2018-02-22 RX ADMIN — ACETAMINOPHEN 975 MG: 325 TABLET, FILM COATED ORAL at 22:09

## 2018-02-22 ASSESSMENT — PAIN DESCRIPTION - DESCRIPTORS
DESCRIPTORS: SHARP
DESCRIPTORS: SHARP

## 2018-02-22 NOTE — PLAN OF CARE
Problem: Bariatric Surgery (Adult,Pediatric)  Goal: Anesthesia/Sedation Recovery  Outcome: Improving  Encouraged pt to sit at bedside, then took pt for a walk.  Ambulated to the end of two hallways, vague dizziness unchanged.  D/c'd PCA, provided 5mg oxycodone, will reassess pain after 45min/1hr.   Currently up in chair and sipping water plus 30mL apple juice.  Provided fluid log, [facility] handout on gastric bypass surgery, and bariatric post-operative patient goals.  Pt and spouse receptive to teaching.

## 2018-02-22 NOTE — PROGRESS NOTES
Received consult for bariatric education.  Patient not appropriate for education this afternoon due to patient uncomfortable complaining of pain.  Patient states it feels like his ribs are hitting his stomach.  Patient was able to take sips fluids today.  Provided diet education materials for patient to review.  RD will follow up with patient mid-day tomorrow.  Patient verbalized understanding of plan.    Ally Villarreal, RD, LD  [facility] Outpatient Dietitian  [phone] (office phone)

## 2018-02-22 NOTE — PLAN OF CARE
Problem: Patient Care Overview  Goal: Plan of Care/Patient Progress Review  Outcome: No Change  VSS, afebrile. Pain controlled with PCA- dilaudid and Toradol. LS dim. BS hypo, denies flatus. Complains of some cramping to abdomen, heat applied with some improvement. 6 lap sites CDI, binder secure. Riley patent with adequate uop, bacitracin applied to tip of catheter for irritation with relief. Dangled and stood at bedside

## 2018-02-22 NOTE — PROVIDER NOTIFICATION
MD Notification    Notified Person:  Sandoval ORDOÑEZ    Notification Date/Time: 02/22/18 2:45 PM    Notification Interaction:  Text paged PA    Purpose of Notification: Pt has a question about his surgery and wants to tell you about his rib pain. Can you call him in his room? Room# [phone]    Orders Received:    Comments:  Pt wants to know the size of his stomach and ask what might be the cause of his right rib pain on inspiration.  Patient was educated on and is performing incentive spirometer, turning/repositioning, coughing and deep breathing, pain management, ambulation, voiding, but still feels like he has an unanswered question as to why the specific site of pain.  Thus, paged provider to discuss with patient to ease pt's concerns.

## 2018-02-22 NOTE — PROGRESS NOTES
"Agree with student note below.  Redding removed  UGI done and passed  Start clears  Encourage ambulation to eliminate pneumoperitoneum more quickly  Antiemetics as needed  Pepcid and simethicone started  Discussed OR findings  Dispo: likely to home tomorrow.    Fernandez Quach PA-C          Bariatric Surgery - PA Student    Mr. Sharp is resting comfortably in bed this morning. He expresses some concerns with pain and dizziness or nausea this morning. He states he feels as though he is having some acid reflux in the epigastrium, as well as pain in the upper left quadrant that is worse when he moves. He has been using tordal and PCA dilaudid, and feels the tordal is managing the pain well, but thinks it might be what is making him dizzy. He is wondering if there is any alternative to making him feel better. The dizziness is also worse when he is moving, and is why he has not done any ambulation. He has successfully dangled and stood at bedside.     He also had complains of abdominal bloating and fullness, which he believes is inhibiting him from taking a full deep breath as it causes pain.    He has been tolerating ice chips over night. Denies any vomiting. Redding patent with adequate output. UOP: 1,075. He complains of some irritation from the redding and expressed desire to remove it as soon as possible. Denies flatus or bowel movement.     He reports removing the abdominal band once he arrived in his room due to it being uncomfortable.     /79  Pulse 81  Temp 99.5  F (37.5  C) (Oral)  Resp 18  Ht 1.803 m (5' 11\")  Wt 125 kg (275 lb 9 oz)  SpO2 98%  BMI 38.43 kg/m2   General: Tired, but alert and cooperative. No acute distress.   Respiratory: Non-labored.  Cardiovascular: Tachycardic, but regular rhythm. Normal S1 and S2. No murmurs appreciated.  Abdomen: Soft, obese. Hypoactive bowel sounds. Non-distended. Appropriately tender to palpation in the left upper quadrant. Abdominal lap sites with some dried " drainage, covered with steris and band-aids.  Extremities: No peripheral edema in bilateral extremities. No calf tenderness.     Glu 109    A/P: Laparoscopic Mauro-en-Y Gastric Bypass performed on 2/21/2018 for Morbid Obesity with Medical Comorbidities    - Encouraged ambulation and activity.   - Due to the patient believing he is having some acid reflux symptoms, encouraged patient to raise the head of his bed. It may be beneficial to utilize Protonix if not already.   - Patient will have an UGI study completed this morning. If results reveal normal findings, patient may be advanced to bariatric clear liquids.   - If patient feels nauseous or dizzy, encouraged to utilize anti-emetics prn medications.   - Due to adequate urinary output, patient's redding could most likely be d/c'd following safe ambulation to the bathroom.    SMITA Narvaez-Student

## 2018-02-22 NOTE — PLAN OF CARE
Problem: Patient Care Overview  Goal: Plan of Care/Patient Progress Review  Outcome: Improving  A&O x4, Tmax 99.8, other VSS, encouraged IS, rechecked 99.0, lap site CDI, abd pain decreased with PCA iv dilaudid & prn toradol. Faint bowel sound yesterday, hypoactive this am. No flatus yet. Denies N/V. Up with SBA. Good UOP via redding. Spouse at bedside & supportive. Plans to have XR Upper GI Water Soluble:Mauro-en-Y and ambulate this am.

## 2018-02-22 NOTE — PROGRESS NOTES
"Bariatric Surgery Progress Note         Assessment:      Edelmira Sharp is a 42 year old male S/P Lap Gastric Bypass, POD #1   Morbid Obesity, BMI >40            Plan:   UGI this am was WNL in my opinion.  Radiology report pending.  Start Protocol:    D/C redding this am.  Done  Start water now.  Clears at lunch.  Fulls tomorrow am.  Will Add Simethicone for gas  D/C PCA today once tolerating PO and start liquid pain medication.  Antiemetics PRN N/V.  Lovenox and PCDs.  Ambulate at least QID.  Discussed OR findings.    Dispo:Likely home tomorrow.        Interval History:   Doing ok.  Feels gas pain and incisional pain.  States in L shape stretching across upper abdomen and down Left flank.  Denies nausea.  Minimal sleep last night but pt wife reports slept a lot after surgery.           Physical Exam:   /78  Pulse 81  Temp 99  F (37.2  C) (Oral)  Resp 18  Ht 1.803 m (5' 11\")  Wt 125 kg (275 lb 9 oz)  SpO2 97%  BMI 38.43 kg/m2  I/O last 3 completed shifts:  In: 4907 [P.O.:30; I.V.:4877]  Out: 2675 [Urine:2675]  General: NAD, pleasant, somewhat alert and oriented x3, some sleepy/lethargic  Abdomen: soft, with binder OPEN.  No BS yet.  Incision: no complaints  UGI: WNL    Labs (most recent at bottom):     Results for orders placed or performed during the hospital encounter of 02/21/18 (from the past 24 hour(s))   Glucose by meter   Result Value Ref Range    Glucose 139 (H) 70 - 99 mg/dL   Glucose by meter   Result Value Ref Range    Glucose 124 (H) 70 - 99 mg/dL   Platelet count   Result Value Ref Range    Platelet Count 249 150 - 450 10e9/L   Creatinine   Result Value Ref Range    Creatinine 0.81 0.66 - 1.25 mg/dL    GFR Estimate >90 >60 mL/min/1.7m2    GFR Estimate If Black >90 >60 mL/min/1.7m2   Hemoglobin A1c   Result Value Ref Range    Hemoglobin A1C 5.7 4.3 - 6.0 %   Glucose by meter   Result Value Ref Range    Glucose 111 (H) 70 - 99 mg/dL   Glucose by meter   Result Value Ref Range    Glucose 117 (H) 70 - " 99 mg/dL   Glucose by meter   Result Value Ref Range    Glucose 113 (H) 70 - 99 mg/dL   Glucose by meter   Result Value Ref Range    Glucose 109 (H) 70 - 99 mg/dL   Hemoglobin   Result Value Ref Range    Hemoglobin 12.6 (L) 13.3 - 17.7 g/dL   Electrolyte panel   Result Value Ref Range    Sodium 138 133 - 144 mmol/L    Potassium 3.9 3.4 - 5.3 mmol/L    Chloride 103 94 - 109 mmol/L    Carbon Dioxide 29 20 - 32 mmol/L    Anion Gap 6 3 - 14 mmol/L         Fernandez Quach  Pager: [phone]  [facility]: [phone]

## 2018-02-23 VITALS
HEART RATE: 72 BPM | RESPIRATION RATE: 16 BRPM | HEIGHT: 71 IN | WEIGHT: 275.56 LBS | BODY MASS INDEX: 38.58 KG/M2 | SYSTOLIC BLOOD PRESSURE: 129 MMHG | TEMPERATURE: 98.3 F | OXYGEN SATURATION: 96 % | DIASTOLIC BLOOD PRESSURE: 76 MMHG

## 2018-02-23 LAB
GLUCOSE BLDC GLUCOMTR-MCNC: 100 MG/DL (ref 70–99)
GLUCOSE BLDC GLUCOMTR-MCNC: 84 MG/DL (ref 70–99)
GLUCOSE BLDC GLUCOMTR-MCNC: 87 MG/DL (ref 70–99)
GLUCOSE BLDC GLUCOMTR-MCNC: 92 MG/DL (ref 70–99)
GLUCOSE BLDC GLUCOMTR-MCNC: 98 MG/DL (ref 70–99)

## 2018-02-23 PROCEDURE — 25000132 ZZH RX MED GY IP 250 OP 250 PS 637: Performed by: SURGERY

## 2018-02-23 PROCEDURE — 25000132 ZZH RX MED GY IP 250 OP 250 PS 637: Performed by: PHYSICIAN ASSISTANT

## 2018-02-23 PROCEDURE — 25000128 H RX IP 250 OP 636: Performed by: PHYSICIAN ASSISTANT

## 2018-02-23 PROCEDURE — 00000146 ZZHCL STATISTIC GLUCOSE BY METER IP

## 2018-02-23 RX ORDER — OXYCODONE HYDROCHLORIDE 5 MG/1
5-10 TABLET ORAL
Qty: 30 TABLET | Refills: 0 | Status: SHIPPED | OUTPATIENT
Start: 2018-02-23 | End: 2018-02-28

## 2018-02-23 RX ADMIN — ENOXAPARIN SODIUM 40 MG: 40 INJECTION SUBCUTANEOUS at 08:55

## 2018-02-23 RX ADMIN — OXYCODONE HYDROCHLORIDE 10 MG: 5 TABLET ORAL at 08:56

## 2018-02-23 RX ADMIN — RANITIDINE 75 MG: 75 TABLET, FILM COATED ORAL at 08:55

## 2018-02-23 RX ADMIN — SODIUM CHLORIDE, POTASSIUM CHLORIDE, SODIUM LACTATE AND CALCIUM CHLORIDE: 600; 310; 30; 20 INJECTION, SOLUTION INTRAVENOUS at 04:37

## 2018-02-23 RX ADMIN — ACETAMINOPHEN 650 MG: 325 TABLET, FILM COATED ORAL at 12:44

## 2018-02-23 RX ADMIN — ACETAMINOPHEN 975 MG: 325 TABLET, FILM COATED ORAL at 05:58

## 2018-02-23 RX ADMIN — OXYCODONE HYDROCHLORIDE 10 MG: 5 TABLET ORAL at 04:30

## 2018-02-23 NOTE — PLAN OF CARE
Problem: Bariatric Surgery (Adult,Pediatric)  Intervention: Support Surgical/Anesthesia Recovery  VSS on RA. A/Ox4. Incisions on abdomen with steri strips and bandaids, REBEKAH, abd binder secure in place. Pain controlled with PRN oxycodone and scheduled tylenol. Up independently, ambulated in halls on shift.  Baltazar clear liquid diet, jackson well. BS active, Flatus -. Voiding adequately to urinal. Lung sounds clear. IS to 750.

## 2018-02-23 NOTE — DISCHARGE SUMMARY
[facility] Discharge Summary    Edelmira Sharp MRN# [id number]   Age: 42 year old YOB: 1975     Date of Admission:  2/21/2018  Date of Discharge:  2/23/2018  Admitting Provider:  Niels Jang MD  Discharge Provider:  Fernandez Quach PA-C with Dr. Jang  Discharging Service: Bariatric Surgery     Primary Provider: Smita Montoya  Primary Care Physician Phone Number: [phone]          Admission Diagnoses:   Principle Diagnosis: morbid obesity   Obesity, Class II, BMI 35-39.9, with comorbidity  Secondary Diagnoses: Hyperlipidemia, Multiple Joint pain possibly worsened by obesity          Discharge Diagnosis:   Morbid obesity           Procedures:   Procedure(s): Laparoscopic Mauro-en-Y gastric bypass            Discharge Medications:     Current Discharge Medication List      START taking these medications    Details   ranitidine (ZANTAC) 75 MG tablet Take 1 tablet (75 mg) by mouth 2 times daily  Qty: 60 tablet, Refills: 2    Associated Diagnoses: Obesity, Class II, BMI 35-39.9, with comorbidity; Bariatric surgery status      oxyCODONE IR (ROXICODONE) 5 MG tablet Take 1-2 tablets (5-10 mg) by mouth every 3 hours as needed for other (pain control or improvement in physical function. Hold dose for analgesic side effects.)  Qty: 30 tablet, Refills: 0    Associated Diagnoses: Bariatric surgery status; Obesity, Class II, BMI 35-39.9, with comorbidity; Acute post-operative pain         STOP taking these medications       METFORMIN HCL PO Comments:   Reason for Stopping:                   Allergies:       No Known Allergies          Brief History of Illness:   Edelmira Sharp is a 42 year old-year-old male who underwent preoperative screening in anticipation for potential bariatric surgery. He was deemed an appropriate candidate for bariatric surgery by the consensus of our [place] [facility] team. In the office, surgical options were thoroughly discussed. He was furnished with a copy of our specialized  consent form for bariatric surgery. The potential risks as outlined in that document were all thoroughly reviewed. All questions were answered and he wished to proceed.    On the day of surgery, after reviewing the risks, benefits, and possible complications, informed consent was obtained and the patient underwent the above procedure.  There were no complications.  Please see the Operative Report for full details.           Hospital Course:   Edelmira Sharp's hospital course was unremarkable.  He recovered as anticipated and experienced no post-operative complications.  Had some vague complaints of nausea or dizziness, and some discomfort under Rt ribs.  These all improved by POD2 and pt felt much better after passing flatus.    On the date of discharge, the patient was discharged to home in stable condition and afebrile.  He verbalized understanding of all discharge instructions and felt comfortable with the discharge plan.  He was asked to call with any further questions or concerns.         Image Results From This Hospital Stay:        Results for orders placed or performed during the hospital encounter of 02/21/18   XR Upper GI Water Soluble: Mauro-en-Y    Narrative    UPPER GI WATER SOLUBLE 2/22/2018 8:39 AM    HISTORY: 42-year-old patient with gastric bypass surgery the day  prior.    TECHNIQUE: Patient was brought to the radiology department and placed  in a standing position. Spot fluoroscopic images were obtained in AP  and lateral projections after administration of Gastrografin.    FINDINGS: Total of 8 spot fluoroscopic images obtained during the  procedure. Contrast extends through the gastric lumen and into the  jejunum with relative ease. No suggestion of leak. Total fluoroscopic  time is 1 minute and total fluoroscopic dose is 45.6 mGy.      Impression    IMPRESSION: Normal appearance of gastric bypass. No visible leak.    MACY BUSBY MD            Condition on Discharge:      Discharge condition:  "Stable   Discharge vitals: Blood pressure 129/76, pulse 72, temperature 98.3  F (36.8  C), temperature source Oral, resp. rate 16, height 1.803 m (5' 11\"), weight 125 kg (275 lb 9 oz), SpO2 96 %.           Discharge Disposition:   Discharged to home          Discharge Instructions and Follow-Up:      Edelmira Sharp was asked to follow up with the bariatric surgical team within 1 week.  He will see our clinic PA-C at that visit, with plans to reconvene with a Registered Dietician at the 2nd week office visit.    Fernandez Quach PA-C  dictating on behalf of Dr. Jang  [phone]     "

## 2018-02-23 NOTE — DISCHARGE INSTRUCTIONS
For informational purposes only. Not to replace the advice of your health care provider. Copyright   2006 [facility]. All rights reserved. [employer] [id number] - REV 09/14  After Bariatric Surgery  [facility]  Note: Before you go home, ask your nurse to order your pain medicine from the pharmacy. Be sure you have your medicine with you when you leave.  How much fluid should I drink?    Drink at least 1 ounce of fluid every 15 minutes (1/2 cup per hour) during the day.     Carry a water bottle with you. Drink from it often.    Keep track of how much fluid you drink in a day.    Adjustable stomach band: Do not overeat or drink too much. This can cause vomiting (throwing up), stretch your stomach, or make your stomach slip up over your band.    Remember:  - Do not use straws, chew gum or suck on hard candies. They may cause painful gas.   - No cold drinks.  - No coffee, soda pop or drinks with caffeine. These may cause stomach pain.  - No alcohol. It is bad for your liver and will cause stomach pain. It also adds a lot of calories.  What can I do for pain control?      You had major abdominal surgery that involves all layers of your abdominal muscles.   Pain is expected, even as far out as 6-8 weeks postop.  Moving, sneezing, coughing, and  breathing will cause pain because these activities use your abdominal muscles.      You can take the liquid pain medicine as prescribed. You can also try to wean off from it  as soon as you feel comfortable.  Do not drive while you are taking pain medicine.   This is dangerous.     You may take liquid Tylenol (acetaminophen) for pain in place of the prescribed pain  medicine. Do not take more than 3000 mg in a 24 hour period.     You may also apply ice or heat to the affected area(s).  Just remember to wrap the ice  in something and limit icing sessions to 20 minutes. Excessive icing can irritate the skin  or cause tissue damage.   You can apply  heat with a hot, wet towel or heating pad. Just like cold therapy, limit  heat application to 20 minutes. Never sleep with a heating pad on. It could cause severe  burns to your skin.    Do not take NSAID s (ibuprofen, Motrin, Advil, Aleve, Naproxen). They will increase you risk for bleeding or getting an ulcer.    If you have any of the following pain issues, we would like to talk to you:  - pain that does not improve with rest  - pain that gets worse and worse  - pain that is not controlled by your pain medicine  - a sudden severe increase in pain  -   If your doctor prescribes Zantac, take it as ordered. Take it for 3 months to prevent       Ulcers.  -   If you took an antacid before you had surgery, keep taking it for 3 months after           surgery. This will help prevent ulcers.   - Take any other medicines that your doctor tells you to take.   - Wear your binder to support your belly muscles.  Please call the clinic at [phone] for any concerns      How much rest do I need?  Get plenty of rest the first few days after surgery. Balance rest and activity.  Do not nap more than one hour during the day. Set a timer to wake yourself up, if needed. Too much sleep will keep you from drinking enough fluid during the day.  What should I know about my incisions (cuts)?  - Change your bandages as needed or if they get wet.   -Call your doctor if you have any of these signs of infection:   - Redness around the site.   - Drainage that smells bad.   - Fever of 101  F (38.3  C) or higher when taken under the tongue.- Chills.  If you     have a drain:  - Do not pull on the drain. Do not pull the drain out. We will take out your drain at your first clinic visit.  - The color and amount of fluid varies. Right after surgery the fluid is bright red. Over time, it changes to light pink and may become clear or the color of straw.   Will my urine or bowel movements change?   You might not have a bowel movement for several days  after surgery. Your first bowel movements will likely be liquid.   Gastric bypass or sleeve gastrectomy: You may also notice old blood or a darker color in your stools (bowel movements).   Your urine should be clear to light yellow. This shows that you are drinking enough fluid. You should urinate (pass water) at least 2 to 3 times during the day. If not, call us.  What kind of activity is safe?  For 4 weeks after surgery:     Walk for a short time every day.    Do not jog or run.    No weight lifting or belly exercises.  No swimming, baths or hot tubs until your cuts are healed (scabs are gone). You may shower.  No outside activity in hot, humid weather until you can drink 48 to 64 ounces of fluid in 24 hours. If you sweat a lot, your body may lose too much water.   The first month after surgery, do not take any trips where you must sit for a long time. You could get a blood clot in your legs.  Call the clinic at [phone] if:    Your pain medicine is not working.    You do not urinate (pass water) 2 to 3 times per day.    You have any signs of infection.    You have belly pain that gets worse and worse.    You have swollen legs with pain behind the knee or calf.    You have chest pain or feel very short of breath.    You have any questions or concerns.    You have a sudden severe increase in heart rate.    You have vomiting that gets worse and worse.  When should I go back to the clinic?  Time Gastric bypass or sleeve gastrectomy Adjustable gastric band   Week 1 See the physician or physician assistant (PA). See the nurse and physical therapist.   Week 2 See the nurse and dietitian. See the nurse and dietitian.   Week 4 Have a nutrition consult with the dietitian. See the PA and dietitian.   Week 6  Go for the first adjustment (fill) of your stomach band.   For the first year (or until your BMI is less than 30) Go to the clinic each month to see if you need a band adjustment (fill).

## 2018-02-23 NOTE — PROGRESS NOTES
"Agree with PA-S note below  He is doing a little better.  Still complaints of dizzyness and/or nausea.  Hard to pin down.  Pain is located to Rt rib.  Hurts with deeper inspiration but does not bother him much to move.  Not passing flatus or stool yet but tolerating diet ok.  Continue increasing activity  D/C to home this afternoon.  F/U next week.        Bariatric Surgery - PA Student    Mr. Sharp is resting comfortably in bed this morning. He seems tired today and answers are typically limited to few words. He believes his pain has improved from yesterday, and the PO oxycodone is providing him relief. His pain typically feels like he's gassy, and he feels this way now. No flatus or bowel movement at this time. He says he still feels dizzy occasionally, but this too has improved for the most part.     He has been tolerating clear liquids, and reports this went fine. Denies nausea or vomiting. Riley was removed yesterday, and has no concerns with voiding. UOP: 1,400.     He has been up and ambulating independently without concerns.     /61 (BP Location: Right arm)  Pulse 75  Temp 98.6  F (37  C) (Oral)  Resp 16  Ht 1.803 m (5' 11\")  Wt 125 kg (275 lb 9 oz)  SpO2 96%  BMI 38.43 kg/m2   General: Tired, limited responses. No acute distress.   Respiratory: Non-labored. Using 1.5 L nasal canula.   Cardiovascular: Tachycardic, but regular rhythm. Normal S1 and S2. No murmurs appreciated.   Abdomen: Soft, obese. Normoactive bowel sounds. Non-distended. Appropriately tender to palpation over the left upper quadrant. Abdominal lap sites with some dried drainage. Covered with steris and band-aids.   Extremities: No peripheral edema in bilateral extremities. No calf tenderness.     Glu 98    A/P: Laparoscopic Mauro-en-Y Gastric Bypass performed on 2/21/18 for Morbid Obesity with Medical Comorbidities    - Patient tolerated clear liquids well, and per protocol may advance to bariatric full liquids.   - Continue cares " - ambulation, incentive spirometry.   - Continue with adequate pain management.   - I believe patient will likely be able to discharge today. He has been uncomfortable due to gas, so it may be a good idea for patient to stay here for breakfast and lunch and see if he can pass some gas before he goes home.     SMITA Narvaez-Student

## 2018-02-23 NOTE — CONSULTS
NUTRITION EDUCATION    REASON FOR ASSESSMENT:  Bariatric Surgery Consult    CURRENT DIET:  Bariatric full liquid    NUTRITION HISTORY:  Patient worked with clinical dietitian in [facility] Loss Clinic prior to surgery to assist with lifestyle modification.    ANTHROPOMETRICS:   Ht: 5'11  Wt: 275 lbs  BMI: 38.4 kg/m2  IBW: 172 lbs +/-10%  %IBW: 160%    ASSESSED NUTRITION NEEDS:  Energy Needs: 0024-6892 kcals (11 - 14 kcal/kg ABW)                      Protein Needs:  gm (1 - 1.5 gm/kg IBW)  Fluid Needs: 3174-9101 mL (1mL/kcal/ABW)    Know patient will not meet assessed needs due to the restrictive nature of surgery.    NUTRITION DIAGNOSIS:   Food- and nutrition related knowledge deficit related to bariatric surgery as evidence by gastric bypass surgery on 2/21/18.    INTERVENTIONS:    Nutrition Prescription:    Recommended patient follow bariatric diet advancement for gastric bypass    Implementation:    Nutrition Education (Content):  a) Reviewed full liquid sleeve gastrectomy diet guidelines  b) Provided handouts:  Nutrition After Gastric Bypass and Vitamin and Mineral Supplements After Weight Loss Surgery    Nutrition Education (Application):  c) Discussed current eating habits and recommended alternative food choices  d) Patient verbalizes understanding of diet by listing appropriate foods for home    Anticipate good compliance    Diet Education - refer to Education Flowsheet    Goals:    Patient will follow bariatric diet advancement schedule    Patient will follow post-operative vitamin mineral schedule      Follow Up:    Patient to follow up in 2 weeks with RD in Weight Loss Clinic    Provided RD contact information for future questions    Ally Villarreal, RD, LD  [facility] Dietitian  [phone] (office phone)

## 2018-02-23 NOTE — PROGRESS NOTES
AVS given to patients, explained and all questions answered. Meds given to patient. All belongings with patient and wife. Patient escorted to ride in wheelchair at  parking by volunteer.

## 2018-02-23 NOTE — PLAN OF CARE
Problem: Patient Care Overview  Goal: Plan of Care/Patient Progress Review  Outcome: No Change  A&Ox4. VSS on 1.5L n/c ex hypotension. Oxycodone for pain. Abdominal incisions with scant dried drainage. Hypoactive BS. Denies flatus. Plan to start [place] full liquid diet this AM for breakfast. Possible d/c today? Continue to monitor.

## 2018-02-28 ENCOUNTER — OFFICE VISIT (OUTPATIENT)
Dept: SURGERY | Facility: CLINIC | Age: 43
End: 2018-02-28
Payer: COMMERCIAL

## 2018-02-28 VITALS
OXYGEN SATURATION: 96 % | WEIGHT: 266.2 LBS | BODY MASS INDEX: 37.13 KG/M2 | RESPIRATION RATE: 14 BRPM | HEART RATE: 66 BPM | SYSTOLIC BLOOD PRESSURE: 107 MMHG | DIASTOLIC BLOOD PRESSURE: 61 MMHG | TEMPERATURE: 97.6 F

## 2018-02-28 DIAGNOSIS — E78.5 HYPERLIPIDEMIA LDL GOAL <160: ICD-10-CM

## 2018-02-28 DIAGNOSIS — K91.2 POSTSURGICAL MALABSORPTION: ICD-10-CM

## 2018-02-28 DIAGNOSIS — E66.9 OBESITY (BMI 30-39.9): Primary | ICD-10-CM

## 2018-02-28 DIAGNOSIS — Z98.84 BARIATRIC SURGERY STATUS: ICD-10-CM

## 2018-02-28 PROBLEM — E66.01 MORBID OBESITY WITH BMI OF 40.0-44.9, ADULT (H): Status: RESOLVED | Noted: 2017-01-17 | Resolved: 2018-02-28

## 2018-02-28 PROCEDURE — 99024 POSTOP FOLLOW-UP VISIT: CPT | Performed by: PHYSICIAN ASSISTANT

## 2018-02-28 PROCEDURE — 99207 ZZC NO CHARGE NURSE ONLY: CPT

## 2018-02-28 RX ORDER — CHOLECALCIFEROL (VITAMIN D3) 25 MCG
1 TABLET,CHEWABLE ORAL
COMMUNITY
End: 2020-04-14

## 2018-02-28 RX ORDER — PNV NO.95/FERROUS FUM/FOLIC AC 28MG-0.8MG
1 TABLET ORAL DAILY
COMMUNITY
End: 2020-04-14

## 2018-02-28 NOTE — PROGRESS NOTES
[facility]   1 Week Surgical Follow-Up     PCP:  Smita Montoya    DOS: 02/21/18  Surgeon: ERICH  Surgery Type: Bypass  HISTORY OF PRESENT ILLNESS:  Edelmira Sharp returns today for her follow-up appointment status post bariatric surgery.  He is currently using Tylenol for pain medication. Taking about 750 mg daily.  Stopped narcotic after first day of being home.  Refill Needed: No.  Patient's Pain Scale: 1. The pain is located left side abdomen.  Has a glucometer at home but has not checked blood sugar.  Had metformin discontinued while inpatient.  Does state he has felt a bit sweaty at times when he is not doing anything that would make his sweaty.  No lightheadedness.    Patient's current daily fluid intake in oz is: 60 oz. water and 24 oz protein drink morning and afternoon.  Patient has started postoperative Vitamins: Yes.  Activity:   Activity: inside walking and outside some  REVIEW OF SYSTEMS:  GI:    Nausea: No  Vomiting: No  Diarrhea: No  Constipation: No  Last BM: yesterday - semi solid and light brown  Dysphagia: No  GERD: Yes - taking zantac 75 mg twice daily    CV/Pulmonary:   Chest Pain: No  Dizzy: No  Light Headed: No  SOB: No  Endo:  Diabetes - Yes.  Stopped metformin at discharge.  Has not taken blood sugars since being home.  Fells a bit sweaty at time.  :    Contraception: male  Dysuria: No  Vascular:    LE Edema: No  PHYSICAL EXAMINATION:    /61 (BP Location: Left arm, Patient Position: Sitting, Cuff Size: Adult Large)  Pulse 66  Temp 97.6  F (36.4  C)  Resp 14  Wt 266 lb 3.2 oz (120.7 kg)  SpO2 96%  BMI 37.13 kg/m2    GENERAL: No acute distress. Alert and oriented time 3.  HEART: Regular rate and rhythm.  LUNGS:  Clear to auscultation bilaterally.  ABDOMEN: Soft, incisions clean,dry, and intact. Tenderness WNL for post op.  EXTREMITIES: No lower extremity edema bilaterally. No calf swelling or tenderness. Negative aishwarya's  SKIN: No rashes.  PSYCHOLOGICAL: Stable.  Pleasant    ASSESSMENT:    1. S/P bariatric surgery.  2. Post surgical malabsorption  3. Diabetes     PLAN:   Continue with recommended antacid medication for the next 3 months.   Strive to drink 64 oz of fluid daily.  Strive to move hourly while awake to decrease risk of developing a blood clot.  Continue to do deep breathing exercises twice daily or use your spirometer.  Follow up with your primary care provider to discuss obesity related conditions by one month post op. - Needs to call primary today and start taking blood sugar readings at least 3 times daily  Start recommended postoperative vitamins within in the first 6 weeks.  Advance diet per Dietitian at your 2 week appointment.   Make follow up appointment to meet with psychologist at 1 and 3 months post operatively.   Wear binder to support abdominal muscles and gradually wean off over the next few weeks.   Return to clinic for 2 wk post op appointment and bring post op vitamins along.  Call with questions or concerns at any time.

## 2018-02-28 NOTE — MR AVS SNAPSHOT
After Visit Summary   2/28/2018    Edelmira Sharp    MRN: [id number]           Patient Information     Date Of Birth          1975        Visit Information        Provider Department      2/28/2018 11:00 AM Alma Beaulieu PA-C [facility]      Today's Diagnoses     Obesity, Class II, BMI 35-39.9, with comorbidity    -  1    Bariatric surgery status        Postsurgical malabsorption        Hyperlipidemia LDL goal <160          Care Instructions    Continue with recommended antacid medication for the next 3 months.   Strive to drink 64 oz of fluid daily.  Strive to move hourly while awake to decrease risk of developing a blood clot.  Continue to do deep breathing exercises twice daily or use your spirometer.  Follow up with your primary care provider to discuss obesity related conditions by one month post op. - Needs to call primary today and start taking blood sugar readings at least 3 times daily  Start recommended postoperative vitamins within in the first 6 weeks.  Advance diet per Dietitian at your 2 week appointment.   Make follow up appointment to meet with psychologist at 1 and 3 months post operatively.   Wear binder to support abdominal muscles and gradually wean off over the next few weeks.   Return to clinic for 2 wk post op appointment and bring post op vitamins along.  Call with questions or concerns at any time          Follow-ups after your visit        Your next 10 appointments already scheduled     Mar 07, 2018 10:00 AM CST   Post Op with Wendy Wilson Rn, RN   [facility] ([facility])    [address] [place] [address] [address]  [facility] [address]   [phone]            Mar 07, 2018 10:30 AM CST   Post Op with Wendy Medina 3, RD   [facility] ([facility])    [address] [place] [address]  [address]  [place] [address] [phone]            Mar 26, 2018  9:00 AM CDT   Post Op with Wendy Wilson Diet 1, RD   [facility] ([facility])    [address] [place] [address] [address]  [place] [address] [phone]              Who to contact     If you have questions or need follow up information about today's clinic visit or your schedule please contact [facility] directly at [phone].  Normal or non-critical lab and imaging results will be communicated to you by InstaJobhart, letter or phone within 4 business days after the clinic has received the results. If you do not hear from us within 7 days, please contact the clinic through Leaky or phone. If you have a critical or abnormal lab result, we will notify you by phone as soon as possible.  Submit refill requests through Leaky or call your pharmacy and they will forward the refill request to us. Please allow 3 business days for your refill to be completed.          Additional Information About Your Visit        InstaJobhart Information     Leaky gives you secure access to your electronic health record. If you see a primary care provider, you can also send messages to your care team and make appointments. If you have questions, please call your primary care clinic.  If you do not have a primary care provider, please call [phone] and they will assist you.        Care EveryWhere ID     This is your Care EveryWhere ID. This could be used by other organizations to access your [facility] medical records  [id number]        Your Vitals Were     Pulse Temperature Respirations Pulse Oximetry BMI (Body Mass Index)       66 97.6  F (36.4  C) 14 96% 37.13 kg/m2        Blood Pressure from Last 3 Encounters:   02/28/18 107/61   02/23/18 129/76   11/16/17 105/45    Weight from Last 3 Encounters:   02/28/18 266 lb 3.2 oz (120.7 kg)   02/21/18 275 lb 9 oz (125 kg)   11/16/17 265 lb 7  oz (120.4 kg)              Today, you had the following     No orders found for display       Primary Care Provider Office Phone # Fax #    Smita Montoya [phone] [address] [facility] [address]        Equal Access to Services     ANTONICODIE BOOTHELIZABETH : Hadii aad ku hadjuanchoo Soomaali, waaxda luqadaha, qaybta kaalmada adeegyada, waxrolly maria din neeln adebeatrice yeager latejakody gomez. So [facility] [phone].    ATENCIÓN: Si habla español, tiene a alcazar disposición servicios gratuitos de asistencia lingüística. Llame al [phone].    We comply with applicable federal civil rights laws and [place] laws. We do not discriminate on the basis of race, color, national origin, age, disability, sex, sexual orientation, or gender identity.            Thank you!     Thank you for choosing [facility] [facility]  for your care. Our goal is always to provide you with excellent care. Hearing back from our patients is one way we can continue to improve our services. Please take a few minutes to complete the written survey that you may receive in the mail after your visit with us. Thank you!             Your Updated Medication List - Protect others around you: Learn how to safely use, store and throw away your medicines at www.disposemymeds.org.          This list is accurate as of 2/28/18 12:08 PM.  Always use your most recent med list.                   Brand Name Dispense Instructions for use Diagnosis    B-12 2500 MCG Tabs      Take 1 chew tab by mouth three times a week        CALCIUM CITRATE + D3 PO      Take 500 mg by mouth 3 times daily Has 800 UIs vitamin d per chew        iron 325 (65 FE) MG tablet      Take 1 tablet by mouth daily        PX MENS MULTIVITAMINS PO      Take 4 chew tab by mouth daily        ranitidine 75 MG tablet    ZANTAC    60 tablet    Take 1 tablet (75 mg) by mouth 2 times daily    Obesity, Class II, BMI 35-39.9, with comorbidity, Bariatric surgery status       TYLENOL PO       Take 750 mg by mouth once Taking 1.5 tabs of 500 mg tab        VITAMIN D PO      Take 1,000 Int'l Units by mouth 3 times daily

## 2018-02-28 NOTE — PATIENT INSTRUCTIONS
Continue with recommended antacid medication for the next 3 months.   Strive to drink 64 oz of fluid daily.  Strive to move hourly while awake to decrease risk of developing a blood clot.  Continue to do deep breathing exercises twice daily or use your spirometer.  Follow up with your primary care provider to discuss obesity related conditions by one month post op. - Needs to call primary today and start taking blood sugar readings at least 3 times daily  Start recommended postoperative vitamins within in the first 6 weeks.  Advance diet per Dietitian at your 2 week appointment.   Make follow up appointment to meet with psychologist at 1 and 3 months post operatively.   Wear binder to support abdominal muscles and gradually wean off over the next few weeks.   Return to clinic for 2 wk post op appointment and bring post op vitamins along.  Call with questions or concerns at any time

## 2018-02-28 NOTE — MR AVS SNAPSHOT
After Visit Summary   2/28/2018    Edelmira Sharp    MRN: [id number]           Patient Information     Date Of Birth          1975        Visit Information        Provider Department      2/28/2018 10:40 AM Rn, Wendy Wilson, RN [facility]      Today's Diagnoses     Obesity (BMI 30-39.9)    -  1    Bariatric surgery status           Follow-ups after your visit        Your next 10 appointments already scheduled     Mar 07, 2018 10:00 AM CST   Post Op with Wendy Wilson Rn, RN   [facility] ([facility])    [address] [place] [address]   [phone]            Mar 07, 2018 10:30 AM CST   Post Op with Wendy Wilson Diet 3, RD   [facility] ([facility])    [address] [place] [address]   [phone]            Mar 26, 2018  9:00 AM CDT   Post Op with Wendy Wilson Diet 1, RD   [facility] ([facility])    [address] [place] [address]   [phone]              Who to contact     If you have questions or need follow up information about today's clinic visit or your schedule please contact [place] [facility] directly at [phone].  Normal or non-critical lab and imaging results will be communicated to you by MyChart, letter or phone within 4 business days after the clinic has received the results. If you do not hear from us within 7 days, please contact the clinic through MyChart or phone. If you have a critical or abnormal lab result, we will notify you by phone as soon as possible.  Submit refill requests through YaBeam or call your pharmacy and they will forward the refill request to us. Please allow 3 business days for your refill to be completed.          Additional  Information About Your Visit        Aneumedhart Information     USERJOY Technology gives you secure access to your electronic health record. If you see a primary care provider, you can also send messages to your care team and make appointments. If you have questions, please call your primary care clinic.  If you do not have a primary care provider, please call [phone] and they will assist you.        Care EveryWhere ID     This is your Care EveryWhere ID. This could be used by other organizations to access your [place] medical records  [id number]         Blood Pressure from Last 3 Encounters:   02/28/18 107/61   02/23/18 129/76   11/16/17 105/45    Weight from Last 3 Encounters:   02/28/18 266 lb 3.2 oz (120.7 kg)   02/21/18 275 lb 9 oz (125 kg)   11/16/17 265 lb 7 oz (120.4 kg)              Today, you had the following     No orders found for display       Primary Care Provider Office Phone # Fax #    Smita Montoya [phone] [address] [facility] [address]        Equal Access to Services     [facility]: Hadii aad ku hadasho Soblu, waaxda luqadaha, qaybta kaalmada adekorin, ivan kidd . So [facility] [phone].    ATENCIÓN: Si habla español, tiene a alcazar disposición servicios gratuitos de asistencia lingüística. [facility] [phone].    We comply with applicable federal civil rights laws and [place] laws. We do not discriminate on the basis of race, color, national origin, age, disability, sex, sexual orientation, or gender identity.            Thank you!     Thank you for choosing [place] [facility]  for your care. Our goal is always to provide you with excellent care. Hearing back from our patients is one way we can continue to improve our services. Please take a few minutes to complete the written survey that you may receive in the mail after your visit with us. Thank you!             Your Updated Medication List - Protect others  around you: Learn how to safely use, store and throw away your medicines at www.disposemymeds.org.          This list is accurate as of 2/28/18  1:12 PM.  Always use your most recent med list.                   Brand Name Dispense Instructions for use Diagnosis    B-12 2500 MCG Tabs      Take 1 chew tab by mouth three times a week        CALCIUM CITRATE + D3 PO      Take 500 mg by mouth 3 times daily Has 800 UIs vitamin d per chew        iron 325 (65 FE) MG tablet      Take 1 tablet by mouth daily        PX MENS MULTIVITAMINS PO      Take 4 chew tab by mouth daily        ranitidine 75 MG tablet    ZANTAC    60 tablet    Take 1 tablet (75 mg) by mouth 2 times daily    Obesity, Class II, BMI 35-39.9, with comorbidity, Bariatric surgery status       TYLENOL PO      Take 750 mg by mouth once Taking 1.5 tabs of 500 mg tab        VITAMIN D PO      Take 1,000 Int'l Units by mouth 3 times daily

## 2018-03-07 ENCOUNTER — OFFICE VISIT (OUTPATIENT)
Dept: SURGERY | Facility: CLINIC | Age: 43
End: 2018-03-07
Payer: COMMERCIAL

## 2018-03-07 VITALS — BODY MASS INDEX: 36.83 KG/M2 | WEIGHT: 263.1 LBS | HEIGHT: 71 IN

## 2018-03-07 DIAGNOSIS — Z98.84 BARIATRIC SURGERY STATUS: ICD-10-CM

## 2018-03-07 DIAGNOSIS — E66.9 OBESITY (BMI 30-39.9): Primary | ICD-10-CM

## 2018-03-07 PROCEDURE — 97803 MED NUTRITION INDIV SUBSEQ: CPT | Performed by: DIETITIAN, REGISTERED

## 2018-03-07 PROCEDURE — 99207 ZZC NO CHARGE NURSE ONLY: CPT

## 2018-03-07 NOTE — PROGRESS NOTES
[facility]  2 Week Surgical Follow-Up     Current PCP:  Smita Montoya  Surgeon: ERICH  HISTORY OF PRESENT ILLNESS:  Edelmira Sharp returns today for his follow-up appointment status post Surgery Type: Bypass DOS: 02/21/18.  He is accompanied by Spouse Pineda. His daily fluid intake in oz is: 60 oz - water and 2 protein drinks.  Feels well emotionally Yes.   Patient reports using: Alcohol - No     Cigarettes - No  Pain:    He is currently using Pain Meds: Tylenol 500 mg daily for pain relief. He rates his pain at a Pain Scale: 1 on the pain scale. The pain is located left side of abdomen.  Refill Needed: No  Activity:  The patient is considered a fall risk: No.   What are you doing for physical activity? 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    Review of Systems:   GI:  Nausea: No   Vomiting: No   GERD: No  Diarrhea: No         Constipation: Yes   Patient's Last BM: today - small hard and tan      Neuro/CV/Pulmonary:   Dizzy: No   Light Headed: No   SOB: No   Chest Pain: No   Vascular:    LE Edema: No  Aline's Negative  :  Form of birth control is Contraception: male  Symptoms of UTI:  Dysuria: No  Endo: Diabetes: Yes  BS check (freq): yes checking 2 times daily at am and pm avg = 100                  Avg. BS Level: avg = 100   Patient has noted what sounds like symptoms of low blood sugar - can't pinpoint the time of day but between meals. Is taking protein shakes between meals. Discussed may improve once advances textures in diet and if doesn't to contact RD.  Patient verbalized understanding and is agreeable to plan.    PHYSICAL EXAMINATION:    VITALS:  BP (P) 106/58 (BP Location: Right arm, Patient Position: Sitting, Cuff Size: Thigh)  Pulse (P) 69  Temp (P) 98  F (36.7  C)  Resp (P) 14  Wt (P) 263 lb 1.6 oz (119.3 kg)  SpO2 (P) 98%  BMI (P) 36.7 kg/m2                    LUNGS:  clear to auscultation  ABDOMEN: Abdomen soft, non-tender. BS  normal. No masses, organomegaly  INCISION: dry and intact    MEDICATIONS/VITAMINS/ALLERGIES REVIEWED: Yes  ASA Hx: No    ASSESSMENT:    1.  DOS: 02/21/18 status post gastric bypass surgery.    INTERVENTIONS:      Symptoms given re: signs and symptoms of infection and when to call clinic. Patient verbalized understanding.  Steri strip removed?  No   Patient removed prior to visit.    PLAN:    Make follow up appointment to meet with psychologist and 1 month post operatively.     Follow up with your primary care provider to obesity related conditions by one month post op. Patient stated he will call this week.    Advance diet per Dietitian.     Continue to strive to drink 64 oz of fluid daily.    Call with questions or concerns at any time.    Return to clinic in 4 weeks for nutrition visit.    Return to clinic postop month 3.    Discussed bowel program: 2-4 ounces prune juice warm on an empty stomach daily, +/- up to 300 mg docusate sodium daily, +/- 1 dose Miralax daily. To call if not regulating bowels.    Guidelines provided re: how to increase activity/exercise?  Yes

## 2018-03-07 NOTE — MR AVS SNAPSHOT
After Visit Summary   3/7/2018    Edelmira Sharp    MRN: [id number]           Patient Information     Date Of Birth          1975        Visit Information        Provider Department      3/7/2018 10:00 AM Rn, Wendy Wilson, RN [facility]      Today's Diagnoses     Obesity (BMI 30-39.9)    -  1    Bariatric surgery status           Follow-ups after your visit        Your next 10 appointments already scheduled     Mar 26, 2018  9:00 AM CDT   Post Op with Wendy Wilson Diet 1, RD   [facility] ([facility])    [facility] [address] [id number] [phone]              Who to contact     If you have questions or need follow up information about today's clinic visit or your schedule please contact [facility] directly at [phone].  Normal or non-critical lab and imaging results will be communicated to you by [employer], letter or phone within 4 business days after the clinic has received the results. If you do not hear from us within 7 days, please contact the clinic through [employer] Secure[employer] or phone. If you have a critical or abnormal lab result, we will notify you by phone as soon as possible.  Submit refill requests through [employer] or call your pharmacy and they will forward the refill request to us. Please allow 3 business days for your refill to be completed.          Additional Information About Your Visit        Quantum Securehart Information     [employer] gives you secure access to your electronic health record. If you see a primary care provider, you can also send messages to your care team and make appointments. If you have questions, please call your primary care clinic.  If you do not have a primary care provider, please call [phone] and they will assist you.        Care EveryWhere ID     This is your Care EveryWhere ID. This could be  used by other organizations to access your [facility] medical records  [id number]         Blood Pressure from Last 3 Encounters:   03/07/18 (P) 106/58   02/28/18 107/61   02/23/18 129/76    Weight from Last 3 Encounters:   03/07/18 263 lb 1.6 oz (119.3 kg)   03/07/18 (P) 263 lb 1.6 oz (119.3 kg)   02/28/18 266 lb 3.2 oz (120.7 kg)              Today, you had the following     No orders found for display       Primary Care Provider Office Phone # Fax #    Smita Montoya [phone] [address] [facility] [address]        Equal Access to Services     NIGRIS CARRERA : Hadii lavinia Amado, wajossieda lugeovanny, qaybta kaalmada deborah, ivan kidd . So [facility] [phone].    ATENCIÓN: Si habla español, tiene a alcazar disposición servicios gratuitos de asistencia lingüística. Ll[facility] [phone].    We comply with applicable federal civil rights laws and [place] laws. We do not discriminate on the basis of race, color, national origin, age, disability, sex, sexual orientation, or gender identity.            Thank you!     Thank you for choosing [facility]  for your care. Our goal is always to provide you with excellent care. Hearing back from our patients is one way we can continue to improve our services. Please take a few minutes to complete the written survey that you may receive in the mail after your visit with us. Thank you!             Your Updated Medication List - Protect others around you: Learn how to safely use, store and throw away your medicines at www.disposemymeds.org.          This list is accurate as of 3/7/18 11:10 AM.  Always use your most recent med list.                   Brand Name Dispense Instructions for use Diagnosis    B-12 2500 MCG Tabs      Take 1 chew tab by mouth three times a week        CALCIUM CITRATE + D3 PO      Take 500 mg by mouth 3 times daily Has 800 UIs vitamin d per chew        iron 325  (65 FE) MG tablet      Take 1 tablet by mouth daily        PX MENS MULTIVITAMINS PO      Take 4 chew tab by mouth daily        ranitidine 75 MG tablet    ZANTAC    60 tablet    Take 1 tablet (75 mg) by mouth 2 times daily    Obesity, Class II, BMI 35-39.9, with comorbidity, Bariatric surgery status       TYLENOL PO      Take 500 mg by mouth once        VITAMIN D PO      Take 1,000 Int'l Units by mouth 3 times daily

## 2018-03-07 NOTE — MR AVS SNAPSHOT
MRN:[id number]                      After Visit Summary   3/7/2018    Edelmira Sharp    MRN: [id number]           Visit Information        Provider Department      3/7/2018 10:30 AM 3Wendy RD [facility]      Your next 10 appointments already scheduled     Mar 26, 2018  9:00 AM CDT   Post Op with Wendy Medina 1, RD   [facility]  [place] ([facility])    [address] [place] [id number] [phone]              MyChart Information     [employer] gives you secure access to your electronic health record. If you see a primary care provider, you can also send messages to your care team and make appointments. If you have questions, please call your primary care clinic.  If you do not have a primary care provider, please call [phone] and they will assist you.        Care EveryWhere ID     This is your Care EveryWhere ID. This could be used by other organizations to access your [facility] medical records  [id number]        Equal Access to Services     CODIE [facility] HospitalELIZABETH : Hadii lavinia victor Soblu, waaxda luqadaha, qaybta kaalmada adekorin, ivan gomez. So [facility] [phone].    ATENCIÓN: Si habla español, tiene a alcazar disposición servicios gratuitos de asistencia lingüística. Llame al [phone].    We comply with applicable federal civil rights laws and [place] laws. We do not discriminate on the basis of race, color, national origin, age, disability, sex, sexual orientation, or gender identity.

## 2018-03-07 NOTE — PROGRESS NOTES
"BARIATRIC PROGRESS NOTE - 2 Week Post Op  DATE OF VISIT: March 7, 2018    Edelmira Sharp  1975  male  [id number]  42 year old    ASSESSMENT:    REASON FOR VISIT:  Edelmira Sharp is a 42 year old year old male presents today for a 2 Week Post Op nutrition follow-up appointment. Patient is accompanied by his wife,  Pineda.      DIAGNOSIS:  Status: post gastric bypass surgery.  Obesity:  Obesity Grade II BMI 35-39.9    ANTHROPOMETRICS:  Height: 180.3 cm (5' 11\")  Initial weight: 307.7 lbs    Current Weight: 119.3 kg (263 lb 1.6 oz)  BMI: 36.77 kg/(m^2).    VITAMINS AND MINERALS:   2 adult doses Multivitamin with Minerals  500 mg Calcium With Vitamin D TID  3000 International units Vitamin D  2500 mcg Vitamin B-12 sublingual  3x/week  65 mg Iron daily (d/t multivitamin not containing iron)    NUTRITION HISTORY:  Tolerating diet: Bariatric full liquid diet  Fluids/water intake: 60 ounces/day  Milk intake: 0 ounces/day (however using protein drinks)  Small bites: Yes  Portion size: 1/2c  20-30 minute meals: Yes  Fluids and meals  by 30 minutes: Yes  Chew foods thoroughly: No/Sometimes  Breakfast: cream of wheat (1/2c made with milk), sometimes adds protein shake for flavor   Lunch: soup or broth, greek yogurt  Dinner: (same as lunch)  Snacks: SF jello  Nausea: occasional  Vomiting: occasional  Additional Information: Pt shares he had some nausea/vomiting following trying some pureed consistencies prematurely. Also asking if he can have cookies if chewed well enough, what oils can be used, etc. Stressed the importance of slow diet advancement and avoiding high sugar/fat items. Reviewed rationale for these guidelines.       PHYSICAL ACTIVITY:  Type: mall walking occasionally; plans to start back at gym    DIAGNOSIS:   Current Nutrition Diagnosis: Altered gastrointestinal function related to alternation in gastrointestinal structure as evidenced by history of gastric bypass.     INTERVENTION  Nutrition Prescription: " Recommended bariatric puree diet.    Goals:  Start puree diet at day 14 post op.  Continue MVI, calcium with vitamin D, vitamin B12, vitamin D, and iron   Aim for 60 to 90 grams protein.  Continue 48 to 64 oz of fluid per day.    Implementation:  Discussed transition to puree diet.  Emphasized importance of adequate protein.  Reviewed required vitamins and mineral supplements.  Verbalizes fair understanding of surgery diet guidelines.  Assessed learning needs and learning preferences.      NUTRITION MONITORING AND EVALUATION:   Monitor diet tolerance and weight loss.      Anticipated Compliance: fair-good  Follow Up: Continue to monitor patient closely regarding weight loss and diet.  At 4 weeks Post Op    TIME SPENT WITH PATIENT: 23 minutes.    Rachel Winston RD, LD  Clinical Dietitian

## 2018-03-13 ENCOUNTER — MYC MEDICAL ADVICE (OUTPATIENT)
Dept: SURGERY | Facility: CLINIC | Age: 43
End: 2018-03-13

## 2018-03-14 DIAGNOSIS — K92.1 BLACK STOOLS: ICD-10-CM

## 2018-03-14 DIAGNOSIS — Z98.84 BARIATRIC SURGERY STATUS: Primary | ICD-10-CM

## 2018-03-14 NOTE — TELEPHONE ENCOUNTER
Patient had gastric bypass 2/21/18.  He sent a My Chart note today c/o black stools since Friday.    Called patient to follow up on My Chart note.  Light headed or dizzy? I was dizzy and light headed today.  Fluid - urine not super yellow - drinking 50 -60 oz. fluid daily.  Any N/V? Vomited couple times due to chunks patient wonders if resulted in bleeding.  If so what is coming up? No blood    Felt tired in middle of day - more tired than has been since surgery.  He reports he stopped iron stopped on Saturday this past weekend but is having black stools as stated in My Chart message.  Is it every stool? Yes - has had Saturday and Sunday - had 6 times between the 2 days. They were black and water was black. Last yesterday - black none today  NL before this BM 1 every 2 days.  HR WNL - no other issues other than the black stools and light headed and dizziness - vomiting a couple of times WNL.    Informed patient I would speak with GERA Marquez and come up with plan and return call.  Spoke with Alma - ordered HGB and hemoccult.    Informed patient re: orders.  To have Hgb done as soon as he can in [facility].  He will have to wait 72 hours after taking vitamin C if > 250 mg.  Last took 600 mg vitamin C yesterday.  Will inform him of results.  Patient verbalized understanding and is agreeable to plan.  Rosa Crouch, MS, RD, RN

## 2018-03-15 DIAGNOSIS — Z98.84 BARIATRIC SURGERY STATUS: ICD-10-CM

## 2018-03-15 DIAGNOSIS — K92.1 BLACK STOOLS: ICD-10-CM

## 2018-03-15 LAB — HGB BLD-MCNC: 11.1 G/DL (ref 13.3–17.7)

## 2018-03-15 PROCEDURE — 85018 HEMOGLOBIN: CPT | Performed by: PHYSICIAN ASSISTANT

## 2018-03-15 PROCEDURE — 36415 COLL VENOUS BLD VENIPUNCTURE: CPT | Performed by: PHYSICIAN ASSISTANT

## 2018-04-05 ENCOUNTER — OFFICE VISIT (OUTPATIENT)
Dept: SURGERY | Facility: CLINIC | Age: 43
End: 2018-04-05
Payer: COMMERCIAL

## 2018-04-05 VITALS — HEIGHT: 71 IN | WEIGHT: 251 LBS | BODY MASS INDEX: 35.14 KG/M2

## 2018-04-05 DIAGNOSIS — Z98.84 BARIATRIC SURGERY STATUS: ICD-10-CM

## 2018-04-05 PROCEDURE — 97803 MED NUTRITION INDIV SUBSEQ: CPT | Performed by: DIETITIAN, REGISTERED

## 2018-04-05 NOTE — PROGRESS NOTES
"BARIATRIC PROGRESS NOTE - 4 Week Post Op  DATE OF VISIT: April 5, 2018    Edelmira Sharp  1975  male  [id number]  42 year old    ASSESSMENT:    REASON FOR VISIT:  Edelmira Sharp is a 42 year old year old male presents today for a 4 Week Post Op (at 6 weeks)nutrition follow-up appointment. Patient is accompanied by self      DIAGNOSIS:  Status: post gastric bypass surgery.  Obesity:  Obesity Grade II BMI 35-39.9    ANTHROPOMETRICS:  Height: 180.3 cm (5' 11\")  Initial weight: 307.7 lbs    Current Weight: 113.9 kg (251 lb)  BMI: 35.08 kg/(m^2).    VITAMINS AND MINERALS:   2 Multivitamin with Minerals  600 mg Calcium With Vitamin D 1x/day  3000 International units Vitamin D  1000 mcg Vitamin B-12 injection  2 iron supplements daily     NUTRITION HISTORY:  Tolerating diet: Bariatric soft solid diet  Fluids/water intake: 60 ounces/day  Small bites: Yes  Portion size: 1 cup  20-30 minute meals: Yes  Fluids and meals  by 30 minutes: Usually   Chew foods thoroughly: Yes  Breakfast: Protein shake upon waking then later will have egg and toast  Lunch: yogurt   Dinner: fish, shrimp, lamb chops, rice   Snacks: yogurt  Nausea: occasional  Vomiting: occasionally (usually after not chewing well enough; strawberry, shrimp, leafy greens)  Additional Information: Pt returns for 4 week post-op appointment at ~6 weeks post-op. Verbalizes significant \"'s remorse\" in regards to surgery. Happy with weight loss but starting to grieve food significantly. Also with fairly low energy levels; explained the importance of adding in fruits, vegetables and whole grains. Pt surprised to hear he should not snack in between meals.       PHYSICAL ACTIVITY:  Type: push ups, upper body workouts, treadmill, stationary bike  Frequency: 4 days a week.  Duration: 30-60 minutes.    DIAGNOSIS:   Previous Nutrition Diagnosis: Altered gastrointestinal function related to alternation in gastrointestinal structure as evidenced by history of gastric " bypass.   No change    Previous Goals:  Start puree diet at day 14 post op. - met  Continue MVI, calcium with vitamin D to BID, vitamin B12, vitamin D, and iron - met  Aim for 60 to 90 grams protein. - met  Continue 48 to 64 oz of fluid per day. - met    Current Nutrition Diagnosis: Altered gastrointestinal function related to alternation in gastrointestinal structure as evidenced by history of gastric bypass.     INTERVENTION  Nutrition Prescription: Recommended bariatric soft diet.    Goals:  Start soft diet at day 28 post op.  Continue MVI, increase calcium with vitamin D to BID, continue vitamin B12, vitamin D, and iron with vitamin C.  Aim for 60 to 90 grams protein.  Continue 48 to 64 oz of fluid per day.    Implementation:  Discussed transition to soft diet.  Emphasized importance of adequate protein.  Reviewed required vitamins and mineral supplements.  Verbalizes fair-good understanding of surgery diet guidelines.  Assessed learning needs and learning preferences.      NUTRITION MONITORING AND EVALUATION:   Monitor diet tolerance and weight loss.      Anticipated Compliance: fair-good  Follow Up: Continue to monitor patient closely regarding weight loss and diet.  At 3 months Post Op    TIME SPENT WITH PATIENT: 35 minutes.    Rachel Winston RD, LD  Clinical Dietitian

## 2018-05-23 ENCOUNTER — OFFICE VISIT (OUTPATIENT)
Dept: SURGERY | Facility: CLINIC | Age: 43
End: 2018-05-23
Payer: COMMERCIAL

## 2018-05-23 ENCOUNTER — HOSPITAL ENCOUNTER (OUTPATIENT)
Dept: LAB | Facility: CLINIC | Age: 43
Discharge: HOME | End: 2018-05-23
Attending: PHYSICIAN ASSISTANT | Admitting: PHYSICIAN ASSISTANT
Payer: COMMERCIAL

## 2018-05-23 VITALS — BODY MASS INDEX: 32.69 KG/M2 | WEIGHT: 233.5 LBS | HEIGHT: 71 IN

## 2018-05-23 VITALS
BODY MASS INDEX: 32.69 KG/M2 | WEIGHT: 233.5 LBS | HEART RATE: 61 BPM | DIASTOLIC BLOOD PRESSURE: 70 MMHG | SYSTOLIC BLOOD PRESSURE: 100 MMHG | HEIGHT: 71 IN

## 2018-05-23 DIAGNOSIS — Z98.84 BARIATRIC SURGERY STATUS: ICD-10-CM

## 2018-05-23 DIAGNOSIS — K91.2 POSTSURGICAL MALABSORPTION: ICD-10-CM

## 2018-05-23 DIAGNOSIS — E66.811 OBESITY (BMI 30.0-34.9): Primary | ICD-10-CM

## 2018-05-23 DIAGNOSIS — E66.811 OBESITY (BMI 30.0-34.9): ICD-10-CM

## 2018-05-23 LAB
FERRITIN SERPL-MCNC: 71 NG/ML (ref 26–388)
IRON SATN MFR SERPL: 17 % (ref 15–46)
IRON SERPL-MCNC: 51 UG/DL (ref 35–180)
PTH-INTACT SERPL-MCNC: 38 PG/ML (ref 18–80)
TIBC SERPL-MCNC: 303 UG/DL (ref 240–430)
VIT B12 SERPL-MCNC: 1278 PG/ML (ref 193–986)

## 2018-05-23 PROCEDURE — 83550 IRON BINDING TEST: CPT | Performed by: PHYSICIAN ASSISTANT

## 2018-05-23 PROCEDURE — 82306 VITAMIN D 25 HYDROXY: CPT | Performed by: PHYSICIAN ASSISTANT

## 2018-05-23 PROCEDURE — 82607 VITAMIN B-12: CPT | Performed by: PHYSICIAN ASSISTANT

## 2018-05-23 PROCEDURE — 83540 ASSAY OF IRON: CPT | Performed by: PHYSICIAN ASSISTANT

## 2018-05-23 PROCEDURE — 36415 COLL VENOUS BLD VENIPUNCTURE: CPT | Performed by: PHYSICIAN ASSISTANT

## 2018-05-23 PROCEDURE — 82728 ASSAY OF FERRITIN: CPT | Performed by: PHYSICIAN ASSISTANT

## 2018-05-23 PROCEDURE — 97803 MED NUTRITION INDIV SUBSEQ: CPT | Performed by: DIETITIAN, REGISTERED

## 2018-05-23 PROCEDURE — 99214 OFFICE O/P EST MOD 30 MIN: CPT | Performed by: PHYSICIAN ASSISTANT

## 2018-05-23 PROCEDURE — 83970 ASSAY OF PARATHORMONE: CPT | Performed by: PHYSICIAN ASSISTANT

## 2018-05-23 NOTE — MR AVS SNAPSHOT
After Visit Summary   5/23/2018    Edelmira Sharp    MRN: [id number]           Patient Information     Date Of Birth          1975        Visit Information        Provider Department      5/23/2018 11:00 AM Alma Beaulieu PA-C [facility]      Today's Diagnoses     Obesity (BMI 30.0-34.9)    -  1    Bariatric surgery status        Postsurgical malabsorption          Care Instructions    3 months status laparoscopic gastric bypass  Body mass index is 32.57 kg/(m^2).  Dizzyness - Blood pressure is low in clinic today. Discussed increasing fluids and the impact of fasting post bariatric surgery.  Should be getting a minimum of 64 oz of fluid in daily.  With humid weather anticipated should shoot for 70 + oz of fluid.  IF this does not correct dizziness then needs to see primary.  Dietitian also went over fasting recommendations  Post surgical malabsorption:   Ordered vitamin B12, vitamin D, PTH, ferritin, TIBC, and iron labs.   Follow food plan per dietitian recommendations.   Continue taking recommended post-op vitamins.   Diabetes - resolved  Return to clinic in 3 months            Follow-ups after your visit        Your next 10 appointments already scheduled     Aug 21, 2018 11:00 AM CDT   Return Visit with Mely Cyr PA-C   [facility])    [address] [place] [id number] [phone]            Aug 21, 2018 11:30 AM CDT   Return Visit with Wendy Wilson Diet 3, RD   [facility] - [facility])    [address] [place] [id number] [phone]              Future tests that were ordered for you today     Open Future Orders        Priority Expected Expires Ordered    Vitamin D Screen Routine 5/23/2018 11/19/2018 5/23/2018    Parathyroid  "Hormone Intact Routine 5/23/2018 11/19/2018 5/23/2018    Iron Routine 5/23/2018 11/19/2018 5/23/2018    Iron and Iron Binding Capacity Routine 5/23/2018 11/19/2018 5/23/2018    Ferritin Routine 5/23/2018 11/19/2018 5/23/2018    Vitamin B12 Routine 5/23/2018 11/19/2018 5/23/2018            Who to contact     If you have questions or need follow up information about today's clinic visit or your schedule please contact [facility] directly at [phone].  Normal or non-critical lab and imaging results will be communicated to you by MyChart, letter or phone within 4 business days after the clinic has received the results. If you do not hear from us within 7 days, please contact the clinic through Cell[employer] or phone. If you have a critical or abnormal lab result, we will notify you by phone as soon as possible.  Submit refill requests through [employer] or call your pharmacy and they will forward the refill request to us. Please allow 3 business days for your refill to be completed.          Additional Information About Your Visit        [employer] Information     [employer] gives you secure access to your electronic health record. If you see a primary care provider, you can also send messages to your care team and make appointments. If you have questions, please call your primary care clinic.  If you do not have a primary care provider, please call [phone] and they will assist you.        Care EveryWhere ID     This is your Care EveryWhere ID. This could be used by other organizations to access your [facility] medical records  [id number]        Your Vitals Were     Pulse Height BMI (Body Mass Index)             61 5' 11\" (1.803 m) 32.57 kg/m2          Blood Pressure from Last 3 Encounters:   05/23/18 100/70   03/07/18 (P) 106/58   02/28/18 107/61    Weight from Last 3 Encounters:   05/23/18 233 lb 8 oz (105.9 kg)   05/23/18 233 lb 8 oz (105.9 kg)   04/05/18 251 lb (113.9 kg)               " Primary Care Provider Office Phone # Fax #    Smita Montoya [phone] [address] [facility] [address]        Equal Access to Services     INGRIS DEBI : Hadrajiv samuels divinalisbet Aneudy, wajossieda luqadaha, qaybta kaneelamda deborah, ivan yeager laTamikoevangelina gomez. So [facility] [phone].    ATENCIÓN: Si habla español, tiene a alcazar disposición servicios gratuitos de asistencia lingüística. Llame al [phone].    We comply with applicable federal civil rights laws and [place] laws. We do not discriminate on the basis of race, color, national origin, age, disability, sex, sexual orientation, or gender identity.            Thank you!     Thank you for choosing [facility] [facility]  for your care. Our goal is always to provide you with excellent care. Hearing back from our patients is one way we can continue to improve our services. Please take a few minutes to complete the written survey that you may receive in the mail after your visit with us. Thank you!             Your Updated Medication List - Protect others around you: Learn how to safely use, store and throw away your medicines at www.disposemymeds.org.          This list is accurate as of 5/23/18  2:36 PM.  Always use your most recent med list.                   Brand Name Dispense Instructions for use Diagnosis    B-12 2500 MCG Tabs      Take 1 chew tab by mouth three times a week        CALCIUM CITRATE + D3 PO      Take 500 mg by mouth 3 times daily Has 800 UIs vitamin d per chew        iron 325 (65 Fe) MG tablet      Take 1 tablet by mouth daily        PX MENS MULTIVITAMINS PO      Take 4 chew tab by mouth daily        ranitidine 75 MG tablet    ZANTAC    60 tablet    Take 1 tablet (75 mg) by mouth 2 times daily    Obesity, Class II, BMI 35-39.9, with comorbidity, Bariatric surgery status       TYLENOL PO      Take 500 mg by mouth once        VITAMIN D PO      Take 1,000 Int'l Units by mouth 3 times  daily

## 2018-05-23 NOTE — PROGRESS NOTES
"NUTRITION POST OP APPOINTMENT  DATE OF VISIT: May 23, 2018    Edelmira Sharp  1975  male  [id number]  42 year old     ASSESSMENT:    REASON FOR VISIT:  Edelmira is a 42 year old year old male presents today for 3 month PO nutrition follow-up appointment. Patient is accompanied by self.    DIAGNOSIS:  Status post gastric bypass surgery.  Obesity Grade I BMI 30-34.9     ANTHROPOMETRICS:  Initial Weight: 139.6 kg (307 lb 11.2 oz)  Height: 180.3 cm (5' 11\")  Current Weight: 105.9 kg (233 lb 8 oz)   BMI: 32.63 kg/(m^2).    VITAMINS AND MINERALS:  2 Multivitamin with Minerals (Gummie Rodriguez brand)-takes with calcium  600 mg Calcium With Vitamin D   Three 2000 International units Vitamin D  1000 Vitamin B-12/ pill  65 mg Iron (will be stopping per Alma Beaulieu)      NUTRITION HISTORY:  Breakfast: protein drink (25 g protein) or 2 eggs, low-fat cheese  Lunch: plain Greek yogurt, cucumber  Supper: chicken thigh, cooked vegetable   Snacks: protein drink (25 g protein)  Fluids consumed: Water, Protein Drink and whole milk (8oz)  Consuming liquid calories: Yes  Protein intake: 65-80 grams/day  Tolerate regular texture food: Yes  Any foods not tolerated details: Yes  If any food not tolerated: fried egg plant  Portion size: 1 cup  Take 20-30 minutes to consume each meal: Yes   Eat protein foods first: Yes  Fluids and meals separate by at least 30 minutes: Yes  Chew foods thoroughly: Yes  Tolerating diet: Yes  Drinking high protein supplements: Yes  Consuming snacks per day: no  Additional Information: fasted for 2 days since the start of Ramadan (started 7 days ago); Fluid intake has been ~ 40 oz per day for the past 2 days and pt said he felt faint yesterday; patient has decided to abstain form fasting due to recent bariatric surgery; during visit pt called his family to send him photos of his vitamin bottles; pt seemed distracted by his phone      PHYSICAL ACTIVITY:  Type: walking, yard work  Frequency (days per week): " 7  Duration (min): 60    DIAGNOSIS:  Previous Nutrition Diagnosis: Altered gastrointestinal function related to alteration in gastrointestinal structure as evidenced by history of gastric bypass surgery.- no change    Previous goals:  Start soft diet at day 28 post op.-met  Continue MVI, increase calcium with vitamin D to BID, continue vitamin B12, vitamin D, and iron with vitamin C.-not met  Aim for 60 to 90 grams protein.-met  Continue 48 to 64 oz of fluid per day.-not met    Current Nutrition Diagnosis: Altered gastrointestinal function related to alteration in gastrointestinal structure as evidenced by history of gastric bypass surgery.    INTERVENTION:   Nutrition Prescription: Eat 3 meals a day at regular intervals. Consume 60-90 grams of protein daily. Follow post-surgical vitamin and mineral protocol.  Assessed learning needs and learning preferences.    GOALS:  Limit protein drinks to one per day  Drink 70 oz of fluid per day  Follow post-op vitamins/ mineral schedule (reviewed in detail and provided written information)  / encouraged pt to bring his new vitamin bottle to the next RD visit      Implementation: Discussed progress toward previous goals; reinforced importance of following bariatric lifestyle changes.    NUTRITION MONITORING AND EVALUATION:  Anticipated compliance: fair  Verbalized fair-good understanding.    Follow up: Patient to follow up in 3 months.    TIME SPENT WITH PATIENT:  40 minutes    Tim Johnson RD, LD  [facility]  [phone]

## 2018-05-23 NOTE — MR AVS SNAPSHOT
MRN:[id number]                      After Visit Summary   5/23/2018    Edelmira Sharp    MRN: [id number]           Visit Information        Provider Department      5/23/2018 11:30 AM 1, Sh Wl Diet, RD [facility]      Your next 10 appointments already scheduled     Aug 21, 2018 11:00 AM CDT   Return Visit with Mely Cyr PA-C   [facility])    [address] [place] [address] [phone]            Aug 21, 2018 11:30 AM CDT   Return Visit with Wendy Wl Diet 3, RD   [facility])    [facility] [address] [phone]              MyChart Information     [employer] gives you secure access to your electronic health record. If you see a primary care provider, you can also send messages to your care team and make appointments. If you have questions, please call your primary care clinic.  If you do not have a primary care provider, please call [phone] and they will assist you.        Care EveryWhere ID     This is your Care EveryWhere ID. This could be used by other organizations to access your [facility] medical records  [id number]        Equal Access to Services     INGRIS CARRERA : Hadii lavinia samuels hadasho Soomaali, waaxda luqadaha, qaybta kaalmada deborah, ivan gomez. So [facility] [phone].    ATENCIÓN: Si habla español, tiene a alcazar disposición servicios gratuitos de asistencia lingüística. Llame al [phone].    We comply with applicable federal civil rights laws and [place] laws. We do not discriminate on the basis of race, color, national origin, age, disability, sex, sexual orientation, or gender identity.

## 2018-05-23 NOTE — PATIENT INSTRUCTIONS
3 months status laparoscopic gastric bypass  Body mass index is 32.57 kg/(m^2).  Dizzyness - Blood pressure is low in clinic today. Discussed increasing fluids and the impact of fasting post bariatric surgery.  Should be getting a minimum of 64 oz of fluid in daily.  With humid weather anticipated should shoot for 70 + oz of fluid.  IF this does not correct dizziness then needs to see primary.  Dietitian also went over fasting recommendations  Post surgical malabsorption:   Ordered vitamin B12, vitamin D, PTH, ferritin, TIBC, and iron labs.   Follow food plan per dietitian recommendations.   Continue taking recommended post-op vitamins.   Diabetes - resolved  Return to clinic in 3 months

## 2018-05-23 NOTE — PROGRESS NOTES
BARIATRIC FOLLOW UP VISIT     May 23, 2018       HISTORY OF PRESENT ILLNESS: Pt returns today for his follow-up appointment status post laparoscopic gastric bypass. His initial concern is dizziness.  Always happens when goes from sitting to standing.  Mainly noticed in the past week. Admits has not been getting enough water in.  Currently on day 7 of Ramadan.  Feels very tired.  Having trouble getting enough water but decided to start drinking a bit more in the past 2 days.  With that said has not reached 40 oz yesterday with much less the previous 6 days.  Has been taking his post op vitamins.        Current Weight: 233 lb 8 oz (105.9 kg)  Initial Weight: 271 lb (122.9 kg)  Cumulative weight loss (lbs): 38 lbs     Patient is taking the following bariatric postoperative vitamins:  2 Complete multivitamins with minerals - gummies with no iron  2000 International Units of Vitamin D daily  1500 mg of Calcium daily in divided doses  Vitamin B12 pill.  Not sure of dose  1 ferrous sulfate - taking one daily with calcium    Pt is exercising lightly.         OBESITY RELATED CONDITIONS:  Diabetes Mellitus II - Hemoglobin A1C went down to 5.0 on May 2., 2018.  No longer diabetic  High cholesterol - has not checked yet.       SOCIAL HISTORY:  Pt denies smoking.  Pt denies alcohol use.  Avoids NSAIDS.  Very light caffiene      REVIEW OF SYSTEMS:     GI:  Nausea-  No   Vomiting- No  Diarrhea- No  Constipation- No. Gets in less than recommended.  30-56 oz approx  Dysphagia- No  Abdominal Pain- No  Heartburn- No. Finished zantac last week.     SKIN:  Intertriginous irritation- No  Hair loss - some       PSYCH:  Depression- No  Anxiety- No      LABS/IMAGING/MEDICAL RECORDS REVIEW: pt's phone looked at [employer] and last hemoglobin A1C      PHYSICAL EXAMINATION:   /70  Pulse 61  Wt 233 lb 8 oz (105.9 kg)  BMI 32.57 kg/m2    GENERAL: Alert and oriented x3. NAD  HEART: No murmurs, rubs or gallops, Regular  rate and rhythm  LUNGS: Breathing unlabored, Lung sounds clear to auscultation bilaterally  ABDOMEN: soft; nontender; nondistended, incision well healed. No hernia  EXTREMITIES: No LE edema bilaterally, Gait normal  SKIN: No intertriginous irritation or rash      ASSESSMENT AND PLAN:      3 months status laparoscopic gastric bypass  Obesity improved- Body mass index is 32.57 kg/(m^2).  Dizzyness - Blood pressure is low in clinic today. Discussed increasing fluids and the impact of fasting post bariatric surgery.  Should be getting a minimum of 64 oz of fluid in daily.  With humid weather anticipated should shoot for 70 + oz of fluid.  IF this does not correct dizziness then needs to see primary.  Dietitian also went over fasting recommendations  Post surgical malabsorption:   Ordered vitamin B12, vitamin D, PTH, ferritin, TIBC, and iron labs.   Follow food plan per dietitian recommendations.   Continue taking recommended post-op vitamins. - WILL LET PATIENT KNOW IF HE SHOULD CONTINUE WITH IRON SUPPLEMENT AFTER REVIEWING LABS  Diabetes - resolved  Return to clinic in 3 months      I spent a total of 25 minutes face to face with Edelmira during today's office visit. Over 50% of this time was spent counseling the patient and/or coordinating care.

## 2018-05-24 LAB — DEPRECATED CALCIDIOL+CALCIFEROL SERPL-MC: 40 UG/L (ref 20–75)

## 2018-08-14 ENCOUNTER — HOSPITAL ENCOUNTER (EMERGENCY)
Facility: CLINIC | Age: 43
Discharge: HOME | End: 2018-08-14
Attending: EMERGENCY MEDICINE | Admitting: EMERGENCY MEDICINE
Payer: COMMERCIAL

## 2018-08-14 VITALS
WEIGHT: 215 LBS | DIASTOLIC BLOOD PRESSURE: 69 MMHG | RESPIRATION RATE: 18 BRPM | OXYGEN SATURATION: 100 % | TEMPERATURE: 97.6 F | SYSTOLIC BLOOD PRESSURE: 104 MMHG | BODY MASS INDEX: 29.99 KG/M2

## 2018-08-14 DIAGNOSIS — S05.01XA ABRASION OF RIGHT CORNEA, INITIAL ENCOUNTER: ICD-10-CM

## 2018-08-14 PROCEDURE — 99283 EMERGENCY DEPT VISIT LOW MDM: CPT

## 2018-08-14 PROCEDURE — 25000125 ZZHC RX 250: Performed by: EMERGENCY MEDICINE

## 2018-08-14 RX ORDER — TETRACAINE HYDROCHLORIDE 5 MG/ML
2 SOLUTION OPHTHALMIC ONCE
Status: COMPLETED | OUTPATIENT
Start: 2018-08-14 | End: 2018-08-14

## 2018-08-14 RX ORDER — CIPROFLOXACIN HYDROCHLORIDE 3.5 MG/ML
SOLUTION/ DROPS TOPICAL
Qty: 1 BOTTLE | Refills: 0 | Status: SHIPPED | OUTPATIENT
Start: 2018-08-14 | End: 2019-02-15

## 2018-08-14 RX ADMIN — TETRACAINE HYDROCHLORIDE 2 DROP: 5 SOLUTION OPHTHALMIC at 09:30

## 2018-08-14 ASSESSMENT — VISUAL ACUITY
OS: 20/30
OD: 20/30

## 2018-08-14 ASSESSMENT — ENCOUNTER SYMPTOMS
EYE REDNESS: 1
EYE PAIN: 1

## 2018-08-14 NOTE — ED AVS SNAPSHOT
[facility] Emergency Department    [address]    [facility] [address]    Phone:  [phone]    Fax:  [phone]                                       Edelmira Sharp   MRN: [id number]    Department:  [facility] Emergency Department   Date of Visit:  8/14/2018           Patient Information     Date Of Birth          1975        Your diagnoses for this visit were:     Abrasion of right cornea, initial encounter        You were seen by Carlita Leal MD.      Follow-up Information     Follow up with [facility].    Specialty:  EMERGENCY MEDICINE    Why:  If symptoms worsen    Contact information:    [address] [place] [address] [phone]        Discharge Instructions       Follow up with ophthalmology clinic today or tomorrow  Start antibiotic eye drops today as soon as possible        Corneal Abrasion    You have received a scratch or scrape (abrasion) to your cornea. The cornea is the clear part in the front of the eye. This sensitive area is very painful when injured. You may make tears frequently, and your vision may be blurry until the injury heals. You may be sensitive to light.  This part of the body heals quickly. You can expect the pain to go away within 24 to 48 hours. If the abrasion is large or deep, your doctor may apply an eye patch, although this is not always done. An antibiotic ointment or eye drops may also be used to prevent infection.  Numbing drops may be used to relieve the pain temporarily so that your eyes can be examined. But these drops can t be prescribed for home use because that would prevent healing and lead to more serious problems. Also, if you can t feel your eye, there is a chance of accidentally injuring it further without knowing it.  Home care    A cold pack may be applied over the eye (or eye patch) for 20 minutes at a time, to reduce pain. To make a cold pack, put  ice cubes in a plastic bag that seals at the top. Wrap the bag in a clean, thin towel or cloth.    You may use acetaminophen or ibuprofen to control pain, unless another pain medicine was prescribed. Note: If you have chronic liver or kidney disease or have ever had a stomach ulcer or GI bleeding, talk with your doctor before using these medicines.    Rest your eyes and don t read until symptoms are gone.    If you use contact lenses, don t wear them until all symptoms are gone.    If your vision is affected by the corneal abrasion or if an eye patch was applied, don t drive a motor vehicle or operate machinery until all symptoms are gone. You may have trouble judging distances using only one eye.    If your eyes are sensitive to light, try wearing sunglasses, or stay indoors until symptoms go away.  Follow-up care  Follow up with your healthcare provider, or as advised.    If no patch was put on your eye and the pain continues for more than 48 hours, you should have another exam. Contact your healthcare provider to arrange this.    If your eye was patched and you were asked to remove the patch yourself, see your healthcare provider. Contact your healthcare provider if you still have pain after the patch is removed.    If you were given a return appointment for patch removal and re-examination, be sure to keep the appointment. Leaving the patch in place longer than advised could be harmful.  When to seek medical advice  Call your healthcare provider right away if any of these occur.    Increasing eye pain or pain that does not improve after 24 hours    Discharge from the eye    Increasing redness of the eye or swelling of the eyelids    Worsening vision    Symptoms get worse after the abrasion has healed  Date Last Reviewed: 7/1/2017 2000-2017 [employer]. [address] [place] [address]. All rights reserved. This information is not intended as a substitute for professional medical care.  Always follow your healthcare professional's instructions.          Your next 10 appointments already scheduled     Aug 21, 2018 11:00 AM CDT   Return Visit with Mely Cyr PA-C   [facility] ([facility])    [address]  [address]  [facility] [address] [phone]            Aug 21, 2018 11:30 AM CDT   Return Visit with Wendy Wilson Diet 3, RD   [facility] ([facility])    [facility] [facility]  [facility] [address] [phone]              24 Hour Appointment Hotline       To make an appointment at any [facility], call [phone] ([phone]). If you don't have a family doctor or clinic, we will help you find one. [facility] clinics are conveniently located to serve the needs of you and your family.             Review of your medicines      START taking        Dose / Directions Last dose taken    ciprofloxacin 0.3 % ophthalmic solution   Commonly known as:  CILOXAN   Quantity:  1 Bottle        Instill 1-2 drops in the affected eye(s) four times per day for 5 days   Refills:  0          Our records show that you are taking the medicines listed below. If these are incorrect, please call your family doctor or clinic.        Dose / Directions Last dose taken    B-12 2500 MCG Tabs   Dose:  1 chew tab        Take 1 chew tab by mouth three times a week   Refills:  0        CALCIUM CITRATE + D3 PO   Dose:  500 mg        Take 500 mg by mouth 3 times daily Has 800 UIs vitamin d per chew   Refills:  0        iron 325 (65 Fe) MG tablet   Dose:  1 tablet        Take 1 tablet by mouth daily   Refills:  0        PX MENS MULTIVITAMINS PO   Dose:  4 chew tab        Take 4 chew tab by mouth daily   Refills:  0        TYLENOL PO   Dose:  500 mg        Take 500 mg by mouth once   Refills:  0        VITAMIN D PO   Dose:  1000 Int'l Units        Take 1,000 Int'l Units by  mouth 3 times daily   Refills:  0                Prescriptions were sent or printed at these locations (1 Prescription)                   Other Prescriptions                Printed at Department/Unit printer (1 of 1)         ciprofloxacin (CILOXAN) 0.3 % ophthalmic solution                Orders Needing Specimen Collection     None      Pending Results     No orders found from 8/12/2018 to 8/15/2018.            Pending Culture Results     No orders found from 8/12/2018 to 8/15/2018.            Pending Results Instructions     If you had any lab results that were not finalized at the time of your Discharge, you can call the ED Lab Result RN at [phone]. You will be contacted by this team for any positive Lab results or changes in treatment. The nurses are available 7 days a week from 10A to 6:30P.  You can leave a message 24 hours per day and they will return your call.        Test Results From Your Hospital Stay               Clinical Quality Measure: Blood Pressure Screening     Your blood pressure was checked while you were in the emergency department today. The last reading we obtained was  BP: 114/64 . Please read the guidelines below about what these numbers mean and what you should do about them.  If your systolic blood pressure (the top number) is less than 120 and your diastolic blood pressure (the bottom number) is less than 80, then your blood pressure is normal. There is nothing more that you need to do about it.  If your systolic blood pressure (the top number) is 120-139 or your diastolic blood pressure (the bottom number) is 80-89, your blood pressure may be higher than it should be. You should have your blood pressure rechecked within a year by a primary care provider.  If your systolic blood pressure (the top number) is 140 or greater or your diastolic blood pressure (the bottom number) is 90 or greater, you may have high blood pressure. High blood pressure is treatable, but if left untreated  over time it can put you at risk for heart attack, stroke, or kidney failure. You should have your blood pressure rechecked by a primary care provider within the next 4 weeks.  If your provider in the emergency department today gave you specific instructions to follow-up with your doctor or provider even sooner than that, you should follow that instruction and not wait for up to 4 weeks for your follow-up visit.        Thank you for choosing [facility]       Thank you for choosing [facility] for your care. Our goal is always to provide you with excellent care. Hearing back from our patients is one way we can continue to improve our services. Please take a few minutes to complete the written survey that you may receive in the mail after you visit with us. Thank you!        [email or web address] Information     [employer] gives you secure access to your electronic health record. If you see a primary care provider, you can also send messages to your care team and make appointments. If you have questions, please call your primary care clinic.  If you do not have a primary care provider, please call [phone] and they will assist you.        Care EveryWhere ID     This is your Care EveryWhere ID. This could be used by other organizations to access your [facility] medical records  [id number]        Equal Access to Services     INGRIS CARRERA : Hadii lavinia Amado, zenon cody, ivan figueroa. So [facility] [phone].    ATENCIÓN: Si habla español, tiene a alcazar disposición servicios gratuitos de asistencia lingüística. Llame al [phone].    We comply with applicable federal civil rights laws and [place] laws. We do not discriminate on the basis of race, color, national origin, age, disability, sex, sexual orientation, or gender identity.            After Visit Summary       This is your record. Keep this with you and show to your community pharmacist(s) and doctor(s) at your  next visit.

## 2018-08-14 NOTE — ED AVS SNAPSHOT
[facility] Emergency Department    [address]    [facility] [address]    Phone:  [phone]    Fax:  [phone]                                       Edelmira Sharp   MRN: [id number]    Department:  [facility] Emergency Department   Date of Visit:  8/14/2018           After Visit Summary Signature Page     I have received my discharge instructions, and my questions have been answered. I have discussed any challenges I see with this plan with the nurse or doctor.    ..........................................................................................................................................  Patient/Patient Representative Signature      ..........................................................................................................................................  Patient Representative Print Name and Relationship to Patient    ..................................................               ................................................  Date                                            Time    ..........................................................................................................................................  Reviewed by Signature/Title    ...................................................              ..............................................  Date                                                            Time

## 2018-08-14 NOTE — DISCHARGE INSTRUCTIONS
Follow up with ophthalmology clinic today or tomorrow  Start antibiotic eye drops today as soon as possible        Corneal Abrasion    You have received a scratch or scrape (abrasion) to your cornea. The cornea is the clear part in the front of the eye. This sensitive area is very painful when injured. You may make tears frequently, and your vision may be blurry until the injury heals. You may be sensitive to light.  This part of the body heals quickly. You can expect the pain to go away within 24 to 48 hours. If the abrasion is large or deep, your doctor may apply an eye patch, although this is not always done. An antibiotic ointment or eye drops may also be used to prevent infection.  Numbing drops may be used to relieve the pain temporarily so that your eyes can be examined. But these drops can t be prescribed for home use because that would prevent healing and lead to more serious problems. Also, if you can t feel your eye, there is a chance of accidentally injuring it further without knowing it.  Home care    A cold pack may be applied over the eye (or eye patch) for 20 minutes at a time, to reduce pain. To make a cold pack, put ice cubes in a plastic bag that seals at the top. Wrap the bag in a clean, thin towel or cloth.    You may use acetaminophen or ibuprofen to control pain, unless another pain medicine was prescribed. Note: If you have chronic liver or kidney disease or have ever had a stomach ulcer or GI bleeding, talk with your doctor before using these medicines.    Rest your eyes and don t read until symptoms are gone.    If you use contact lenses, don t wear them until all symptoms are gone.    If your vision is affected by the corneal abrasion or if an eye patch was applied, don t drive a motor vehicle or operate machinery until all symptoms are gone. You may have trouble judging distances using only one eye.    If your eyes are sensitive to light, try wearing sunglasses, or stay indoors until  symptoms go away.  Follow-up care  Follow up with your healthcare provider, or as advised.    If no patch was put on your eye and the pain continues for more than 48 hours, you should have another exam. Contact your healthcare provider to arrange this.    If your eye was patched and you were asked to remove the patch yourself, see your healthcare provider. Contact your healthcare provider if you still have pain after the patch is removed.    If you were given a return appointment for patch removal and re-examination, be sure to keep the appointment. Leaving the patch in place longer than advised could be harmful.  When to seek medical advice  Call your healthcare provider right away if any of these occur.    Increasing eye pain or pain that does not improve after 24 hours    Discharge from the eye    Increasing redness of the eye or swelling of the eyelids    Worsening vision    Symptoms get worse after the abrasion has healed  Date Last Reviewed: 7/1/2017 2000-2017 [employer]. [address] [place] [address], [place] [address]. All rights reserved. This information is not intended as a substitute for professional medical care. Always follow your healthcare professional's instructions.

## 2018-08-14 NOTE — ED PROVIDER NOTES
History     Chief Complaint:  Right eye injury    HPI   Edelmira Sharp is a 42 year old male who presents with right eye injury. The patient states he was doing landscaping last night and one the evergreen needles scratched his right eye. The patient states he feels like there is something in the eye and he states it is painful and swollen. The patient denies wearing contacts or glasses. Per review of [facility], the patient's last tetanus was in 2013.     Allergies:  No Known Allergies     Medications:    No medication.    Past Medical History:    anterior cruciate ligament tear    Gastric bypass status for obesity   Hip pain   Hyperlipidemia    Labral tear of hip, degenerative   Lumbago   Obese   Right knee pain     Past Surgical History:    Abdomen surgery  Laparoscopic bypass gastric  Orthopedic surgery, ACL    Family History:    Mother: diabetes  maternal uncle: prostate cancer     Social History:  Smoking Status: Never Smoker  Smokeless Tobacco: Never Used  Alcohol Use: No  Marital Status:       Review of Systems   Eyes: Positive for pain and redness.        Swollen right eyelid   All other systems reviewed and are negative.    Physical Exam   First Vitals:  BP: 114/64  Heart Rate: 61  Temp: 97.6  F (36.4  C)  Resp: 18  Weight: 97.5 kg (215 lb)  SpO2: 98 %  Visual Acuity-Left: 20/30  Visual Acuity-Right: 20/30    Physical Exam  General: Resting comfortably on the gurney  Eyes:  The pupils are equal and round, reactive to light bilaterally    EOMI    Conjunctival injection on right, difficulty keeping eye open    Lesion at 2 pm position on cornea with fluorescein    No foreign bodies seen    No dendritic lesions    No cells or flare    IOP R 20, L 18  ENT:    Moist mucous membranes  Neck:  Normal range of motion  CV:  Regular rate and rhythm    Skin warm and well perfused   Resp:  Non labored breathing on room air  MS:  Normal muscular tone  Skin:  No rash or acute skin lesions noted  Neuro:   Awake, alert.       Speech is normal and fluent.    Face is symmetric.     Moves all extremities equally  Psych: Normal affect.  Appropriate interactions.    Emergency Department Course     Interventions:  0930 Pontocaine 2 drop both eyes    Emergency Department Course:    0819 Nursing notes and vitals reviewed.    0822 I performed an exam of the patient as documented above.     0830 I inspected the patients eye with slit lamp    0850 I personally answered all related questions prior to discharge.    Impression & Plan      Medical Decision Making:  Edelmira Sharp is a 42 year old male who presents to the emergency department today for evaluation of eye pain. Visual acuity equal bilaterally. Likely corneal abrasion on eye causing symptoms given exam and history. The edges appear to be not quite as clear as usually seen which may be from lesion healing, less likely ulcer. Not dendritic. Not an open globe. Will start on antibiotic drops and recommended f/u with ophthalmology in next 1-2 days. TDAP up to date.    Diagnosis:    ICD-10-CM    1. Abrasion of right cornea, initial encounter S05.01XA         Disposition:   The patient is discharged to home.    Discharge Medications:  New Prescriptions    CIPROFLOXACIN (CILOXAN) 0.3 % OPHTHALMIC SOLUTION    Instill 1-2 drops in the affected eye(s) four times per day for 5 days     Scribe Disclosure:  I, Veda Can, am serving as a scribe at 8:22 AM on 8/14/2018 to document services personally performed by Carlita Leal MD based on my observations and the provider's statements to me.  [facility] EMERGENCY DEPARTMENT       Carlita Leal MD  08/14/18 2026

## 2018-08-14 NOTE — ED TRIAGE NOTES
Pt was doing landscaping last night and tree branch scratched pts eye. Pt c/o of pain in right eye, tearing, and swelling.     ABC Intact, pt alert.

## 2018-08-21 ENCOUNTER — OFFICE VISIT (OUTPATIENT)
Dept: SURGERY | Facility: CLINIC | Age: 43
End: 2018-08-21
Payer: COMMERCIAL

## 2018-08-21 VITALS
WEIGHT: 211.1 LBS | HEIGHT: 71 IN | SYSTOLIC BLOOD PRESSURE: 101 MMHG | HEART RATE: 64 BPM | DIASTOLIC BLOOD PRESSURE: 62 MMHG | RESPIRATION RATE: 12 BRPM | BODY MASS INDEX: 29.55 KG/M2 | OXYGEN SATURATION: 98 %

## 2018-08-21 VITALS — WEIGHT: 211.1 LBS | BODY MASS INDEX: 29.55 KG/M2 | HEIGHT: 71 IN

## 2018-08-21 DIAGNOSIS — D64.9 ANEMIA, UNSPECIFIED TYPE: ICD-10-CM

## 2018-08-21 DIAGNOSIS — Z98.84 BARIATRIC SURGERY STATUS: ICD-10-CM

## 2018-08-21 DIAGNOSIS — Z98.84 BARIATRIC SURGERY STATUS: Primary | ICD-10-CM

## 2018-08-21 DIAGNOSIS — R42 POSTURAL LIGHTHEADEDNESS: ICD-10-CM

## 2018-08-21 DIAGNOSIS — K91.2 POSTSURGICAL MALABSORPTION: ICD-10-CM

## 2018-08-21 PROBLEM — E66.811 OBESITY (BMI 30.0-34.9): Status: RESOLVED | Noted: 2018-05-23 | Resolved: 2018-08-21

## 2018-08-21 PROCEDURE — 99213 OFFICE O/P EST LOW 20 MIN: CPT | Performed by: PHYSICIAN ASSISTANT

## 2018-08-21 PROCEDURE — 97803 MED NUTRITION INDIV SUBSEQ: CPT | Performed by: DIETITIAN, REGISTERED

## 2018-08-21 NOTE — MR AVS SNAPSHOT
MRN:[id number]                      After Visit Summary   8/21/2018    Edelmira Sharp    MRN: [id number]           Visit Information        Provider Department      8/21/2018 11:30 AM 3, Sh Wl Diet, RD [facility]      Your next 10 appointments already scheduled     Feb 21, 2019 10:00 AM CST   Return Bariatric Visit with Mely Cyr PA-C   [facility])    [address] [place] [id number] [phone]            Feb 21, 2019 10:30 AM CST   Return Bariatric Nutrition Visit with Sh Wl Diet 3, RD   [facility])    [facility] [address] [id number] [phone]              MyChart Information     ApiFi[employer] gives you secure access to your electronic health record. If you see a primary care provider, you can also send messages to your care team and make appointments. If you have questions, please call your primary care clinic.  If you do not have a primary care provider, please call [phone] and they will assist you.        Care EveryWhere ID     This is your Care EveryWhere ID. This could be used by other organizations to access your [place] medical records  [id number]        Equal Access to Services     INGRIS CARRERA : Hadii lavinia murciao Soblu, waaxda luqadaha, qaybta kaalmada deborah, ivan gomez. So [facility] [phone].    ATENCIÓN: Si habla español, tiene a alcazar disposición servicios gratuitos de asistencia lingüística. Llame al [phone].    We comply with applicable federal civil rights laws and [place] laws. We do not discriminate on the basis of race, color, national origin, age, disability, sex, sexual orientation, or gender identity.

## 2018-08-21 NOTE — MR AVS SNAPSHOT
After Visit Summary   8/21/2018    Edelmira Sharp    MRN: [id number]           Patient Information     Date Of Birth          1975        Visit Information        Provider Department      8/21/2018 11:00 AM Mely Cyr PA-C [facility]      Today's Diagnoses     Postural dizziness    -  1    Bariatric surgery status        Postsurgical malabsorption        Anemia, unspecified type           Follow-ups after your visit        Follow-up notes from your care team     Return in about 6 months (around 2/21/2019).      Your next 10 appointments already scheduled     Feb 21, 2019 10:00 AM CST   Return Bariatric Visit with Mely Cyr PA-C   [facility] ([facility])    [address] [place] [id number] [phone]            Feb 21, 2019 10:30 AM CST   Return Bariatric Nutrition Visit with [facility] Diet 3, RD   [facility] ([facility])    [address] [place] [id number] [phone]              Future tests that were ordered for you today     Open Future Orders        Priority Expected Expires Ordered    CBC with platelets Routine 8/21/2018 2/17/2019 8/21/2018    Iron and Iron Binding Capacity Routine 8/21/2018 2/17/2019 8/21/2018    Ferritin Routine 8/21/2018 2/17/2019 8/21/2018    Vitamin D Screen Routine 8/21/2018 2/17/2019 8/21/2018    Parathyroid Hormone Intact Routine 8/21/2018 2/17/2019 8/21/2018            Who to contact     If you have questions or need follow up information about today's clinic visit or your schedule please contact [facility] directly at [phone].  Normal or non-critical lab and imaging results will be communicated to you by MyChart, letter or phone within 4 business days  "after the clinic has received the results. If you do not hear from us within 7 days, please contact the clinic through madvertise or phone. If you have a critical or abnormal lab result, we will notify you by phone as soon as possible.  Submit refill requests through madvertise or call your pharmacy and they will forward the refill request to us. Please allow 3 business days for your refill to be completed.          Additional Information About Your Visit        SportsPursuitharDigePrint Information     madvertise gives you secure access to your electronic health record. If you see a primary care provider, you can also send messages to your care team and make appointments. If you have questions, please call your primary care clinic.  If you do not have a primary care provider, please call [phone] and they will assist you.        Care EveryWhere ID     This is your Care EveryWhere ID. This could be used by other organizations to access your [place] medical records  [id number]        Your Vitals Were     Pulse Respirations Height Pulse Oximetry BMI (Body Mass Index)       64 12 5' 11\" (1.803 m) 98% 29.44 kg/m2        Blood Pressure from Last 3 Encounters:   08/21/18 101/62   08/14/18 104/69   05/23/18 100/70    Weight from Last 3 Encounters:   08/21/18 211 lb 1.6 oz (95.8 kg)   08/21/18 211 lb 1.6 oz (95.8 kg)   08/14/18 215 lb (97.5 kg)               Primary Care Provider Office Phone # Fax #    Smita Montoya [phone] [address] [facility] [address]        Equal Access to Services     [facility] Rosa[facility]IZ[facility] : Hadii lavinia samuels hadasho Soomaali, waaxda luqadaha, qaybta kaalmaivan dunne . So [facility] [phone].    ATENCIÓN: Si habla español, tiene a alcazar disposición servicios gratuitos de asistencia lingüística. Llame al [phone].    We comply with applicable federal civil rights laws and [place] laws. We do not discriminate on the basis of race, color, national origin, age, " disability, sex, sexual orientation, or gender identity.            Thank you!     Thank you for choosing [facility]  for your care. Our goal is always to provide you with excellent care. Hearing back from our patients is one way we can continue to improve our services. Please take a few minutes to complete the written survey that you may receive in the mail after your visit with us. Thank you!             Your Updated Medication List - Protect others around you: Learn how to safely use, store and throw away your medicines at www.disposemymeds.org.          This list is accurate as of 8/21/18  2:17 PM.  Always use your most recent med list.                   Brand Name Dispense Instructions for use Diagnosis    B-12 2500 MCG Tabs      Take 1 chew tab by mouth three times a week        CALCIUM CITRATE + D3 PO      Take 500 mg by mouth 3 times daily Has 800 UIs vitamin d per chew        ciprofloxacin 0.3 % ophthalmic solution    CILOXAN    1 Bottle    Instill 1-2 drops in the affected eye(s) four times per day for 5 days        iron 325 (65 Fe) MG tablet      Take 1 tablet by mouth daily        PX MENS MULTIVITAMINS PO      Take 2 tablets by mouth daily        TYLENOL PO      Take 500 mg by mouth once        VITAMIN D PO      Take 4,000 Int'l Units by mouth daily

## 2018-08-21 NOTE — PROGRESS NOTES
"NUTRITION POST OP APPOINTMENT  DATE OF VISIT: August 21, 2018    Edelmira Sharp  1975  male  [id number]  42 year old     ASSESSMENT:    REASON FOR VISIT:  Edelmira is a 42 year old year old male presents today for 6 month PO nutrition follow-up appointment. Patient is accompanied by self.    DIAGNOSIS:  Status post gastric bypass surgery.  Obesity Overweight BMI 25-29.9     ANTHROPOMETRICS:  Initial Weight: 307.7 lbs   Height: 180.3 cm (5' 11\")  Current Weight: 95.8 kg (211 lb 1.6 oz)   BMI: 29.5 kg/(m^2).    VITAMINS AND MINERALS:  2 Multivitamin with Minerals  600 mg Calcium With Vitamin D once daily or does not take (PA-C reviewed and made recommendations)  4000 International units Vitamin D  1000 mcg Vitamin B-12 sublingual  Daily Vitron C    NUTRITION HISTORY:  Breakfast: (1.5-2h after waking) banana, yogurt and milk, miah bars/protein bars   Lunch: (skipping)   Supper: salad (Edna's salad bar) or big bowl - chicken + vegetables (will eat 25-33% of meal and save leftovers)   Snacks: almonds, yogurt, avocado, apple, dried chickpeas (1 snack/day, typically)  Fluids consumed: Water, coffee (caffeinated, ~6oz), milk, Honest tea (70kcals)  Consuming liquid calories: Yes  Protein intake: 50-60 grams/day  Tolerate regular texture food: Yes  Any foods not tolerated details: Yes  If any food not tolerated: fried food   Portion size: 1 cup or more (1.5-2c)  Take 20-30 minutes to consume each meal: No   Eat protein foods first: Yes  Fluids and meals separate by at least 30 minutes: Yes  Chew foods thoroughly: Yes  Tolerating diet: Yes  Drinking high protein supplements: No  Consuming snacks per day: 1  Additional Information: Pt struggling with craving sugar. Concerned about stretching out pouch. Reviewed post-op bariatric recommendations and discussed getting \"back to basics\" with portions and habits. Pt tracks intake occasionally, provided several days in 900-1000 kcal range. Discussed appropriate calorie ranges. " Recommended avoid caloric beverages other than milk/protein drinks and avoid high fat items like nuts and avocado. Pt distracted by phone for much of visit.         PHYSICAL ACTIVITY:  Type: Strength training + cardio  Frequency (days per week): 3  Duration (min): 60    DIAGNOSIS:  Previous Nutrition Diagnosis: Altered gastrointestinal function related to alteration in gastrointestinal structure as evidenced by history of gastric bypass surgery.- no change    Previous goals:  Limit protein drinks to one per day - met  Drink 70 oz of fluid per day - met  Follow post-op vitamins/ mineral schedule (reviewed in detail and provided written information) - improving  / encouraged pt to bring his new vitamin bottle to the next RD visit    Current Nutrition Diagnosis: Altered gastrointestinal function related to alteration in gastrointestinal structure as evidenced by history of gastric bypass surgery.    INTERVENTION:   Nutrition Prescription: Eat 3 meals a day at regular intervals. Consume 60-90 grams of protein daily. Follow post-surgical vitamin and mineral protocol.  Assessed learning needs and learning preferences.    GOALS:  Do not exceed 1 cup per meal  Replace snacks with milk or protein drink  Have breakfast within 1 h of waking  Do not skip lunch  Aim for 600-800kcals and 60-90g protein    Follow-Up:   Recommend standard post-op follow up visits to assist with lifestyle changes or per insurance.  Implementation: Discussed progress toward previous goals; reinforced importance of following bariatric lifestyle changes.    NUTRITION MONITORING AND EVALUATION:  Anticipated compliance: fair  Verbalized fair-good understanding.    Follow up: Patient to follow up in 6 months, at 1 year post-op    TIME SPENT WITH PATIENT:  20 minutes    Rachel Winston RD, LD  Clinical Dietitian

## 2018-08-21 NOTE — PROGRESS NOTES
"BARIATRIC FOLLOW UP VISIT     August 21, 2018     HISTORY OF PRESENT ILLNESS: Pt returns today for his follow-up appointment status post laparoscopic gastric bypass.     Initial Weight: 307 lb (139.3 kg)   Current Weight: 211 lb 1.6 oz (95.8 kg)  Cumulative weight loss (lbs): 95.9  Last Visits Weight: 233 lb (105.7 kg)     Patient is taking the following bariatric postoperative vitamins:  2 Complete multivitamins with minerals (at different times than calcium)   2000 International Units of Vitamin D daily  600 mg of Calcium daily some days of the week- often forgets   1000 mcg of Vitamin B12 sl daily  2 Iron/Vit C. daily    Pt is exercising by weightlifting for an hour and cardio for 30 minutes 3 x a week.       OBESITY RELATED CONDITIONS:  Diabetes Mellitus II -resolved  Hemoglobin A1C 5.0 on 5/2/18    High cholesterol - has not checked yet.     SOCIAL HISTORY:  Pt denies smoking.  Pt denies alcohol use.  Avoids NSAIDS.      REVIEW OF SYSTEMS:     GI:  Nausea-no  Vomiting-no  Diarrhea-jno  Constipation-no  Dysphagia-no  Abdominal Pain-no  Heartburn-no     SKIN:  Intertriginous irritation- erythema under pannus.         PSYCH:  Depression-mood good and stable.  Is more mellow since surgery.   Anxiety-none      LABS/IMAGING/MEDICAL RECORDS REVIEW:   Component      Latest Ref Rng & Units 3/15/2018 5/23/2018   Iron      35 - 180 ug/dL  51   Iron Binding Cap      240 - 430 ug/dL  303   Iron Saturation Index      15 - 46 %  17   Hemoglobin      13.3 - 17.7 g/dL 11.1 (L)    Vitamin D Deficiency screening      20 - 75 ug/L  40   Parathyroid Hormone Intact      18 - 80 pg/mL  38   Ferritin      26 - 388 ng/mL  71   Vitamin B12      193 - 986 pg/mL  1278 (H)       PHYSICAL EXAMINATION:   /62 (BP Location: Left arm, Patient Position: Sitting, Cuff Size: Adult Regular)  Pulse 64  Resp 12  Ht 5' 11\" (1.803 m)  Wt 211 lb 1.6 oz (95.8 kg)  SpO2 98%  BMI 29.44 kg/m2    GENERAL: Alert and oriented x3. NAD  HEART: No " murmurs, rubs or gallops, Regular rate and rhythm  LUNGS: Breathing unlabored, Lung sounds clear to auscultation bilaterally  ABDOMEN: soft; nontender; nondistended, incision well healed. No hernia  EXTREMITIES: No LE edema bilaterally, Gait normal  SKIN: No intertriginous irritation or rash      ASSESSMENT AND PLAN:    6 months status laparoscopic gastric bypass  Overweight Body mass index is 29.44 kg/(m^2).  Post surgical malabsorption:   Ordered vitamin D, PTH,  labs.   Follow food plan per dietitian recommendations.   Continue taking recommended post-op vitamins.   Discussed taking a calcium after work at supper and at bedtime.    Anemia   Ordered CBC, ferritin, TIBC, and iron labs.   Continue 2 iron supplements, can take at same time along with multivitamin in the morning.    Will mychart pt following blood draw to let him know if he needs to continue 2 iron supplements.   Postural lightheadedness   Reviewed MOA for this. Most likely due to anemia and dehydation.   Discussed pushing fluids when this symptoms is present. Also discussed holding on to something to avoid falling.       Return to clinic in 6 months.       I spent a total of 20 minutes face to face with Edelmira during today's office visit. Over 50% of this time was spent counseling the patient and/or coordinating care.

## 2018-09-26 ENCOUNTER — HOSPITAL ENCOUNTER (OUTPATIENT)
Dept: LAB | Facility: CLINIC | Age: 43
Discharge: HOME | End: 2018-09-26
Attending: PHYSICIAN ASSISTANT | Admitting: PHYSICIAN ASSISTANT
Payer: COMMERCIAL

## 2018-09-26 DIAGNOSIS — D64.9 ANEMIA, UNSPECIFIED TYPE: ICD-10-CM

## 2018-09-26 DIAGNOSIS — Z98.84 BARIATRIC SURGERY STATUS: ICD-10-CM

## 2018-09-26 DIAGNOSIS — K91.2 POSTSURGICAL MALABSORPTION: ICD-10-CM

## 2018-09-26 LAB
ERYTHROCYTE [DISTWIDTH] IN BLOOD BY AUTOMATED COUNT: 13.4 % (ref 10–15)
FERRITIN SERPL-MCNC: 98 NG/ML (ref 26–388)
HCT VFR BLD AUTO: 41.9 % (ref 40–53)
HGB BLD-MCNC: 14 G/DL (ref 13.3–17.7)
IRON SATN MFR SERPL: 26 % (ref 15–46)
IRON SERPL-MCNC: 78 UG/DL (ref 35–180)
MCH RBC QN AUTO: 30.1 PG (ref 26.5–33)
MCHC RBC AUTO-ENTMCNC: 33.4 G/DL (ref 31.5–36.5)
MCV RBC AUTO: 90 FL (ref 78–100)
PLATELET # BLD AUTO: 265 10E9/L (ref 150–450)
PTH-INTACT SERPL-MCNC: 30 PG/ML (ref 12–64)
RBC # BLD AUTO: 4.65 10E12/L (ref 4.4–5.9)
TIBC SERPL-MCNC: 296 UG/DL (ref 240–430)
WBC # BLD AUTO: 9.6 10E9/L (ref 4–11)

## 2018-09-26 PROCEDURE — 85027 COMPLETE CBC AUTOMATED: CPT | Performed by: PHYSICIAN ASSISTANT

## 2018-09-26 PROCEDURE — 36415 COLL VENOUS BLD VENIPUNCTURE: CPT | Performed by: PHYSICIAN ASSISTANT

## 2018-09-26 PROCEDURE — 83550 IRON BINDING TEST: CPT | Performed by: PHYSICIAN ASSISTANT

## 2018-09-26 PROCEDURE — 82306 VITAMIN D 25 HYDROXY: CPT | Performed by: PHYSICIAN ASSISTANT

## 2018-09-26 PROCEDURE — 82728 ASSAY OF FERRITIN: CPT | Performed by: PHYSICIAN ASSISTANT

## 2018-09-26 PROCEDURE — 83540 ASSAY OF IRON: CPT | Performed by: PHYSICIAN ASSISTANT

## 2018-09-26 PROCEDURE — 83970 ASSAY OF PARATHORMONE: CPT | Performed by: PHYSICIAN ASSISTANT

## 2018-09-27 LAB — DEPRECATED CALCIDIOL+CALCIFEROL SERPL-MC: 37 UG/L (ref 20–75)

## 2018-11-06 ENCOUNTER — OFFICE VISIT (OUTPATIENT)
Dept: SURGERY | Facility: CLINIC | Age: 43
End: 2018-11-06
Payer: COMMERCIAL

## 2018-11-06 VITALS
OXYGEN SATURATION: 98 % | WEIGHT: 207.4 LBS | SYSTOLIC BLOOD PRESSURE: 91 MMHG | RESPIRATION RATE: 12 BRPM | HEIGHT: 71 IN | BODY MASS INDEX: 29.03 KG/M2 | HEART RATE: 78 BPM | DIASTOLIC BLOOD PRESSURE: 59 MMHG

## 2018-11-06 VITALS — HEIGHT: 71 IN | BODY MASS INDEX: 29.03 KG/M2 | WEIGHT: 207.4 LBS

## 2018-11-06 DIAGNOSIS — K91.2 POSTSURGICAL MALABSORPTION: ICD-10-CM

## 2018-11-06 DIAGNOSIS — Z98.84 BARIATRIC SURGERY STATUS: ICD-10-CM

## 2018-11-06 DIAGNOSIS — E66.3 OVERWEIGHT (BMI 25.0-29.9): Primary | ICD-10-CM

## 2018-11-06 DIAGNOSIS — E16.1 REACTIVE HYPOGLYCEMIA: ICD-10-CM

## 2018-11-06 PROCEDURE — 97803 MED NUTRITION INDIV SUBSEQ: CPT | Performed by: DIETITIAN, REGISTERED

## 2018-11-06 PROCEDURE — 99214 OFFICE O/P EST MOD 30 MIN: CPT | Performed by: PHYSICIAN ASSISTANT

## 2018-11-06 NOTE — PROGRESS NOTES
"NUTRITION POST OP APPOINTMENT  DATE OF VISIT: November 6, 2018    Edelmira Sharp  1975  male  [id number]  42 year old     ASSESSMENT:    REASON FOR VISIT:  Edelmira is a 42 year old year old male presents today for 9 month PO nutrition follow-up appointment. Patient is accompanied by self.    DIAGNOSIS:  Status post gastric bypass surgery.  Obesity Overweight BMI 25-29.9     ANTHROPOMETRICS:  Initial Weight: 307.7 lbs    Height: 180.3 cm (5' 11\")  Current Weight: 94.1 kg (207 lb 6.4 oz)   BMI: 28.99 kg/(m^2).    VITAMINS AND MINERALS:  2 Multivitamin with Minerals  600 mg Calcium With Vitamin D BID  4000 International units Vitamin D  1000 mcg Vitamin B-12 sublingual, 3x/week  2 Vitron C    NUTRITION HISTORY:  Breakfast: (skips) or banana or toast  Lunch: (skips) or [employer]'s buffet salad with chicken or salmon   Supper: pad Lebanese (noodles and shrimp) + soup  meat + vegetables + starch  rice and beans  Snacks: apples or yogurt, ice cream to correct low BG   Fluids consumed: Water  Consuming liquid calories: No  Protein intake: 40-60 grams/day  Tolerate regular texture food: Yes  Any foods not tolerated details: Yes  If any food not tolerated: milk, high fat items  Portion size: 1.5 cups  Take 20-30 minutes to consume each meal: Sometimes  Eat protein foods first: No  Fluids and meals separate by at least 30 minutes: No  Chew foods thoroughly: No  Tolerating diet: Yes  Drinking high protein supplements: No  Consuming snacks per day: 2  Additional Information: Pt struggling with hypoglycemia. Reviewed elements of diet that can lead to low BG after bariatric surgery. Stressed the importance of appropriate portions,  fluids from meals, protein intake alongside CHO, consistent meals, etc. Provided pt with hypoglycemia diet information. Discussed how to better correct low BG.  Pt somewhat resistant to making changes despite detailed explanation of CHO-insulin-glucose physiology .Verbalizes understanding but then " "unable to reiterate recommended changes. Also tends to justify habits with perceived theories about bariatric anatomy, despite repeated re-educating (ie believes he needs to \"overcorrect\" low BG with ice cream because of malabsorption). Would again assess comprehension and compliance at next visit; pt may benefit from meeting with an RD/CDE or OP RD for more thorough exploration of role food plays in glucose management.       PHYSICAL ACTIVITY:  Type: weight lifting, cardio and walking  Frequency (days per week): 3  Duration (min): 30-60    DIAGNOSIS:  Previous Nutrition Diagnosis: Altered gastrointestinal function related to alteration in gastrointestinal structure as evidenced by history of gastric bypass surgery.- no change    Previous goals:  Do not exceed 1 cup per meal - not met  Replace snacks with milk or protein drink - not met  Have breakfast within 1 h of waking - not met  Do not skip lunch - not met  Aim for 600-800kcals and 60-90g protein - not met    Current Nutrition Diagnosis: Altered gastrointestinal function related to alteration in gastrointestinal structure as evidenced by history of gastric bypass surgery.    INTERVENTION:   Nutrition Prescription: Eat 3 meals a day at regular intervals. Consume 60-90 grams of protein daily. Follow post-surgical vitamin and mineral protocol.  Assessed learning needs and learning preferences.    GOALS:  Aim for 3 meals/day, 1 cup in size.   To help with hypoglycemia, have 2 snacks, 1/2c in size  Separate fluids and meals  Always have a source of protein at all meals and snacks.   Do not skip meals    Follow-Up:   Recommend annual follow up visits to assist with lifestyle changes or per insurance.  Implementation: Discussed progress toward previous goals; reinforced importance of following bariatric lifestyle changes.    NUTRITION MONITORING AND EVALUATION:  Anticipated compliance: fair  Verbalized fair understanding.    Follow up: Patient to follow up in 3 " months, at 1 year post-op    TIME SPENT WITH PATIENT:  25 minutes    Rachel Winston RD, LD  Clinical Dietitian

## 2018-11-06 NOTE — MR AVS SNAPSHOT
After Visit Summary   11/6/2018    Edelmira Sharp    MRN: [id number]           Patient Information     Date Of Birth          1975        Visit Information        Provider Department      11/6/2018 1:00 PM Mely Cyr PA-C [facility]      Care Instructions    Reactive hypoglycemia is low blood sugar typically 1.5 to 3 hours following a meal.  Reactive hypoglycemia may occur in patients one year or more after their bariatric surgery. The further out from surgery you are, the more tuned in to your body and reactions to food you become the low blood sugar is not related to a diabetes insulin reaction or even a former diagnosis of diabetes prior to surgery. Its simply a side effect of gastric bypass that happens in a small amount of patients. Pre-surgery excess weight leads to excess insulin production and insulin resistance. Additionally, the bypass surgery leads to certain hormone level increases that also increase insulin production. After surgery, patients become more sensitive to insulin, more rapidly clearing sugars from the blood stream and this is why low blood sugars, reactive hypoglycemia, can occur.    Prevention:  Maintaining a constant blood sugar level throughout the day is the goal. Make sure you are eating balanced meals or snacks 3 to 4 times daily, spaced 3 to 4 hours apart. Include a lean protein and high-fiber grains, fruits and vegetables. Avoid sugary foods. The sweets will trigger the over-secretion of insulin, resulting in the blood sugar drop. Also, avoid alcohol. Alcohol inhibits blood sugar regulation in the liver and can contribute to low blood sugar. It also inhibits our decision making in general, leading to poor eating choices.      Reactive Hypoglycemia     Reactive hypoglycemia can be common in sleeve gastrectomy or gastric bypass patients.  It is usually due to an excess insulin  response caused by a rapid emptying of the stomach.  It happens 45 to 60 minutes after eating a meal, especially one that is high in carbohydrates.   Symptoms you may feel include:  sweaty   jittery   lightheaded   your heart is racing     To avoid reactive hypoglycemia, try the following steps:  Avoid simple carbohydrates such and limit sugar intake  Eat small meals and healthy snacks about every 3 hours  Exercise regularly  Eat protein first  Eat a variety of foods, including meat, poultry, fish, or nonmeat sources of protein, foods such as whole-grains, fruits, vegetables, and dairy products  Reactive hypoglycemia is treatable.  If symptoms continue, call [phone] and schedule an appointment with the dietitian and physician assistant.                 Follow-ups after your visit        Your next 10 appointments already scheduled     Feb 21, 2019 10:00 AM CST   Return Bariatric Visit with Mely Cyr PA-C   [facility] ([facility])    [address] [place] [id number] [phone]            Feb 21, 2019 10:30 AM CST   Return Bariatric Nutrition Visit with Wendy Wilson Diet 3, RD   [facility] ([facility])    [address] [place] [id number] [phone]              Who to contact     If you have questions or need follow up information about today's clinic visit or your schedule please contact [facility] directly at [phone].  Normal or non-critical lab and imaging results will be communicated to you by MyChart, letter or phone within 4 business days after the clinic has received the results. If you do not hear from us within 7 days, please contact the clinic through MyChart or phone. If you have a critical or abnormal lab result, we will notify you by phone as soon as possible.  Submit refill  "requests through [employer] or call your pharmacy and they will forward the refill request to us. Please allow 3 business days for your refill to be completed.          Additional Information About Your Visit        [employer] Information     [employer] gives you secure access to your electronic health record. If you see a primary care provider, you can also send messages to your care team and make appointments. If you have questions, please call your primary care clinic.  If you do not have a primary care provider, please call [phone] and they will assist you.        Care EveryWhere ID     This is your Care EveryWhere ID. This could be used by other organizations to access your [facility] medical records  [id number]        Your Vitals Were     Pulse Respirations Height Pulse Oximetry BMI (Body Mass Index)       78 12 5' 11\" (1.803 m) 98% 28.93 kg/m2        Blood Pressure from Last 3 Encounters:   11/06/18 91/59   08/21/18 101/62   08/14/18 104/69    Weight from Last 3 Encounters:   11/06/18 207 lb 6.4 oz (94.1 kg)   08/21/18 211 lb 1.6 oz (95.8 kg)   08/21/18 211 lb 1.6 oz (95.8 kg)              Today, you had the following     No orders found for display       Primary Care Provider Office Phone # Fax #    Smita Montoya [phone] [address] [facility] [address]        Equal Access to Services     [facility]: Hadii aad ku hadasho Soelsyali, waaxda luqadaha, qaybta kaalmada adebeatriceyada, ivan kidd . So [facility] [phone].    ATENCIÓN: Si habla español, tiene a alcazar disposición servicios gratuitos de asistencia lingüística. Llame al [phone].    We comply with applicable federal civil rights laws and [place] laws. We do not discriminate on the basis of race, color, national origin, age, disability, sex, sexual orientation, or gender identity.            Thank you!     Thank you for choosing [facility]  for your care. Our goal " is always to provide you with excellent care. Hearing back from our patients is one way we can continue to improve our services. Please take a few minutes to complete the written survey that you may receive in the mail after your visit with us. Thank you!             Your Updated Medication List - Protect others around you: Learn how to safely use, store and throw away your medicines at www.disposemymeds.org.          This list is accurate as of 11/6/18  1:32 PM.  Always use your most recent med list.                   Brand Name Dispense Instructions for use Diagnosis    B-12 2500 MCG Tabs      Take 1 chew tab by mouth three times a week        CALCIUM CITRATE + D3 PO      Take 500 mg by mouth 3 times daily Has 800 UIs vitamin d per chew        ciprofloxacin 0.3 % ophthalmic solution    CILOXAN    1 Bottle    Instill 1-2 drops in the affected eye(s) four times per day for 5 days        iron 325 (65 Fe) MG tablet      Take 1 tablet by mouth daily        PX MENS MULTIVITAMINS PO      Take 2 tablets by mouth daily        TYLENOL PO      Take 500 mg by mouth once        VITAMIN D PO      Take 4,000 Int'l Units by mouth daily

## 2018-11-06 NOTE — PROGRESS NOTES
"BARIATRIC FOLLOW UP VISIT     November 6, 2018       HISTORY OF PRESENT ILLNESS: Pt returns today for his follow-up appointment status post laparoscopic gastric bypass. Comes in today because he has been having 1 month hx of dizzy spells consisting of sudden onset of sweating, lightheadedness, dizziness, weakness, and difficulty concentrating.  States it has occurred about 3 times. He sent a [employer] message last week and I suspect his symptoms may be due to reactive hypoglycemia following meals.     Looking back Edelmira says worst episode happened after he had not eaten during a busy day. He had worked out and walked the mall that day.   He had a heavy meal that night at 6 pm  And then went to [facility] where he was dancing. His symptoms occurred around 730 pm. His blood sugar was 118 30 minutes following incident and 20 minutes post eating ice cream.  He went to his [place] family practice on 10/30/2018 who asked him to try eating more proteins with meals to see if that helps with the symptoms.  He also got a new glucometer.      He has not had any symptoms since.  Does not feel this is because he has changed his eating habits and added more protein.  Has made sure not to forget to eat all day and then have a heavy meal.  Is very concerned with the \"why\" this is occurring.  Wonders if he has type 1 diabetes.      Initial Weight: 307 lb 11.2 oz (139.6 kg)   Current Weight: 207 lb 6.4 oz (94.1 kg)  Cumulative weight loss (lbs): 100.3  Last Visits Weight: 211 lb (95.7 kg)     Patient is taking the following bariatric postoperative vitamins:  2 Complete multivitamins with minerals (at different times than calcium)   4000 International Units of Vitamin D daily  9676-2020 mg of Calcium daily in divided doses  2500 mcg of Vitamin B12 sl TIW  1 Iron/Vit C. daily    SOCIAL HISTORY:  Pt denies smoking.  Pt denies alcohol use. Denies taking Excedrin or NSAIDS.    REVIEW OF SYSTEMS:   " "  GI:  Nausea-none  Vomiting-none  Diarrhea-none  Constipation-none  Dysphagia-none  Abdominal Pain-none  Heartburn-none     SKIN:  Intertriginous irritation-none, excess abdominal skin at pannus    LABS/IMAGING/MEDICAL RECORDS REVIEW:   Component      Latest Ref Rng & Units 9/26/2018   WBC      4.0 - 11.0 10e9/L 9.6   RBC Count      4.4 - 5.9 10e12/L 4.65   Hemoglobin      13.3 - 17.7 g/dL 14.0   Hematocrit      40.0 - 53.0 % 41.9   MCV      78 - 100 fl 90   MCH      26.5 - 33.0 pg 30.1   MCHC      31.5 - 36.5 g/dL 33.4   RDW      10.0 - 15.0 % 13.4   Platelet Count      150 - 450 10e9/L 265   Iron      35 - 180 ug/dL 78   Iron Binding Cap      240 - 430 ug/dL 296   Iron Saturation Index      15 - 46 % 26   Ferritin      26 - 388 ng/mL 98   Vitamin D Deficiency screening      20 - 75 ug/L 37   Parathyroid Hormone Intact      12 - 64 pg/mL 30     PHYSICAL EXAMINATION:   BP 91/59 (BP Location: Right arm, Patient Position: Sitting, Cuff Size: Adult Large)  Pulse 78  Resp 12  Ht 5' 11\" (1.803 m)  Wt 207 lb 6.4 oz (94.1 kg)  SpO2 98%  BMI 28.93 kg/m2    GENERAL Pt in NAD.  HEART: No JVD  LUNGS: Breathing unlabored.  ABD: Soft, NT, incisions well healed  MUSC: Gait normal  NEURO: Alert and oriented x3.     ASSESSMENT AND PLAN:    8 months status laparoscopic gastric bypass  Overweight Body mass index is 28.93 kg/(m^2).  Post surgical malabsorption:   Continue taking recommended post-op vitamins.  Iron deficiency-resolved   Decrease iron supplement to one daily   Will repeat iron labs at annual appt in February  Reactive hypoglycemia   Reviewed and gave written patient education regarding peactive hypoglycemia.   Pt to see dietician today to go over diet recommendations to help maintain a constant blood sugar.     Expect with diet changes his symptoms frequency should lessen.  If this does not improve we discussed medication management.  Will review at annual appt.  Could order endo consult at that time in " necessary.     Follow up in 4 months for annual visit with PA-C and dietician.     I spent a total of 25 minutes face to face with Edelmira during today's office visit. Over 50% of this time was spent counseling the patient and/or coordinating care.

## 2018-11-06 NOTE — PATIENT INSTRUCTIONS
Reactive hypoglycemia is low blood sugar typically 1.5 to 3 hours following a meal.  Reactive hypoglycemia may occur in patients one year or more after their bariatric surgery. The further out from surgery you are, the more tuned in to your body and reactions to food you become the low blood sugar is not related to a diabetes insulin reaction or even a former diagnosis of diabetes prior to surgery. Its simply a side effect of gastric bypass that happens in a small amount of patients. Pre-surgery excess weight leads to excess insulin production and insulin resistance. Additionally, the bypass surgery leads to certain hormone level increases that also increase insulin production. After surgery, patients become more sensitive to insulin, more rapidly clearing sugars from the blood stream and this is why low blood sugars, reactive hypoglycemia, can occur.    Prevention:  Maintaining a constant blood sugar level throughout the day is the goal. Make sure you are eating balanced meals or snacks 3 to 4 times daily, spaced 3 to 4 hours apart. Include a lean protein and high-fiber grains, fruits and vegetables. Avoid sugary foods. The sweets will trigger the over-secretion of insulin, resulting in the blood sugar drop. Also, avoid alcohol. Alcohol inhibits blood sugar regulation in the liver and can contribute to low blood sugar. It also inhibits our decision making in general, leading to poor eating choices.      Reactive Hypoglycemia     Reactive hypoglycemia can be common in sleeve gastrectomy or gastric bypass patients.  It is usually due to an excess insulin response caused by a rapid emptying of the stomach.  It happens 45 to 60 minutes after eating a meal, especially one that is high in carbohydrates.   Symptoms you may feel include:  sweaty   jittery   lightheaded   your heart is racing     To avoid reactive hypoglycemia, try the following steps:  Avoid simple carbohydrates such and limit sugar intake  Eat small meals  and healthy snacks about every 3 hours  Exercise regularly  Eat protein first  Eat a variety of foods, including meat, poultry, fish, or nonmeat sources of protein, foods such as whole-grains, fruits, vegetables, and dairy products  Reactive hypoglycemia is treatable.  If symptoms continue, call [phone] and schedule an appointment with the dietitian and physician assistant.

## 2019-02-14 ENCOUNTER — TELEPHONE (OUTPATIENT)
Dept: SURGERY | Facility: CLINIC | Age: 44
End: 2019-02-14

## 2019-02-14 DIAGNOSIS — K91.2 POSTSURGICAL MALABSORPTION: ICD-10-CM

## 2019-02-14 DIAGNOSIS — Z98.84 BARIATRIC SURGERY STATUS: ICD-10-CM

## 2019-02-14 NOTE — TELEPHONE ENCOUNTER
Reason for call:  Other   Patient called regarding (reason for call): Patient is having a physical as well as labs tomorrow at [facility]. Patient is wondering if any other labs Mely yi may need can be done tomorrow at his clinic. Patient would like a call back.  Additional comments: none    Phone number to reach patient:  Home number on file [phone] (home)    Best Time:  anytime    Can we leave a detailed message on this number?  YES

## 2019-02-15 ENCOUNTER — OFFICE VISIT (OUTPATIENT)
Dept: FAMILY MEDICINE | Facility: CLINIC | Age: 44
End: 2019-02-15
Payer: COMMERCIAL

## 2019-02-15 VITALS
DIASTOLIC BLOOD PRESSURE: 57 MMHG | OXYGEN SATURATION: 100 % | TEMPERATURE: 97.8 F | RESPIRATION RATE: 16 BRPM | WEIGHT: 208 LBS | BODY MASS INDEX: 29.12 KG/M2 | HEIGHT: 71 IN | SYSTOLIC BLOOD PRESSURE: 103 MMHG | HEART RATE: 62 BPM

## 2019-02-15 DIAGNOSIS — K91.2 POSTSURGICAL MALABSORPTION: ICD-10-CM

## 2019-02-15 DIAGNOSIS — Z11.4 SCREENING FOR HIV (HUMAN IMMUNODEFICIENCY VIRUS): ICD-10-CM

## 2019-02-15 DIAGNOSIS — R53.83 FATIGUE, UNSPECIFIED TYPE: ICD-10-CM

## 2019-02-15 DIAGNOSIS — Z98.84 BARIATRIC SURGERY STATUS: ICD-10-CM

## 2019-02-15 DIAGNOSIS — Z00.00 ROUTINE PHYSICAL EXAMINATION: Primary | ICD-10-CM

## 2019-02-15 DIAGNOSIS — Z13.89 SCREENING FOR DIABETIC PERIPHERAL NEUROPATHY: ICD-10-CM

## 2019-02-15 LAB
CHOLEST SERPL-MCNC: 144 MG/DL
ERYTHROCYTE [DISTWIDTH] IN BLOOD BY AUTOMATED COUNT: 12.9 % (ref 10–15)
FERRITIN SERPL-MCNC: 113 NG/ML (ref 26–388)
HBA1C MFR BLD: 5.3 % (ref 0–5.6)
HCT VFR BLD AUTO: 45.2 % (ref 40–53)
HDLC SERPL-MCNC: 60 MG/DL
HGB BLD-MCNC: 14.7 G/DL (ref 13.3–17.7)
IRON SATN MFR SERPL: 35 % (ref 15–46)
IRON SERPL-MCNC: 116 UG/DL (ref 35–180)
LDLC SERPL CALC-MCNC: 72 MG/DL
MCH RBC QN AUTO: 30.2 PG (ref 26.5–33)
MCHC RBC AUTO-ENTMCNC: 32.5 G/DL (ref 31.5–36.5)
MCV RBC AUTO: 93 FL (ref 78–100)
NONHDLC SERPL-MCNC: 84 MG/DL
PLATELET # BLD AUTO: 263 10E9/L (ref 150–450)
PTH-INTACT SERPL-MCNC: 24 PG/ML (ref 18–80)
RBC # BLD AUTO: 4.86 10E12/L (ref 4.4–5.9)
TIBC SERPL-MCNC: 331 UG/DL (ref 240–430)
TRIGL SERPL-MCNC: 60 MG/DL
VIT B12 SERPL-MCNC: 1036 PG/ML (ref 193–986)
WBC # BLD AUTO: 8.2 10E9/L (ref 4–11)

## 2019-02-15 PROCEDURE — 36415 COLL VENOUS BLD VENIPUNCTURE: CPT | Performed by: FAMILY MEDICINE

## 2019-02-15 PROCEDURE — 82728 ASSAY OF FERRITIN: CPT | Performed by: FAMILY MEDICINE

## 2019-02-15 PROCEDURE — 85027 COMPLETE CBC AUTOMATED: CPT | Performed by: FAMILY MEDICINE

## 2019-02-15 PROCEDURE — 83540 ASSAY OF IRON: CPT | Performed by: FAMILY MEDICINE

## 2019-02-15 PROCEDURE — 87389 HIV-1 AG W/HIV-1&-2 AB AG IA: CPT | Performed by: FAMILY MEDICINE

## 2019-02-15 PROCEDURE — 80061 LIPID PANEL: CPT | Performed by: FAMILY MEDICINE

## 2019-02-15 PROCEDURE — 83036 HEMOGLOBIN GLYCOSYLATED A1C: CPT | Performed by: FAMILY MEDICINE

## 2019-02-15 PROCEDURE — 83970 ASSAY OF PARATHORMONE: CPT | Performed by: FAMILY MEDICINE

## 2019-02-15 PROCEDURE — 83550 IRON BINDING TEST: CPT | Performed by: FAMILY MEDICINE

## 2019-02-15 PROCEDURE — 82306 VITAMIN D 25 HYDROXY: CPT | Performed by: FAMILY MEDICINE

## 2019-02-15 PROCEDURE — 82607 VITAMIN B-12: CPT | Performed by: FAMILY MEDICINE

## 2019-02-15 PROCEDURE — 99396 PREV VISIT EST AGE 40-64: CPT | Performed by: FAMILY MEDICINE

## 2019-02-15 ASSESSMENT — ENCOUNTER SYMPTOMS
NAUSEA: 0
ARTHRALGIAS: 0
SHORTNESS OF BREATH: 0
EYE PAIN: 0
DIZZINESS: 1
NERVOUS/ANXIOUS: 0
HEMATURIA: 0
HEMATOCHEZIA: 0
FREQUENCY: 0
HEARTBURN: 0
PARESTHESIAS: 0
DYSURIA: 0
JOINT SWELLING: 0
CONSTIPATION: 0
CHILLS: 0
ABDOMINAL PAIN: 0
FEVER: 0
PALPITATIONS: 0
COUGH: 0
WEAKNESS: 1
HEADACHES: 0
MYALGIAS: 0
DIARRHEA: 0
SORE THROAT: 0

## 2019-02-15 ASSESSMENT — MIFFLIN-ST. JEOR: SCORE: 1855.61

## 2019-02-15 NOTE — PATIENT INSTRUCTIONS
Preventive Health Recommendations  Male Ages 40 to 49    Yearly exam:             See your health care provider every year in order to  o   Review health changes.   o   Discuss preventive care.    o   Review your medicines if your doctor has prescribed any.    You should be tested each year for STDs (sexually transmitted diseases) if you re at risk.     Have a cholesterol test every 5 years.     Have a colonoscopy (test for colon cancer) if someone in your family has had colon cancer or polyps before age 50.     After age 45, have a diabetes test (fasting glucose). If you are at risk for diabetes, you should have this test every 3 years.      Talk with your health care provider about whether or not a prostate cancer screening test (PSA) is right for you.    Shots: Get a flu shot each year. Get a tetanus shot every 10 years.     Nutrition:    Eat at least 5 servings of fruits and vegetables daily.     Eat whole-grain bread, whole-wheat pasta and brown rice instead of white grains and rice.     Get adequate Calcium and Vitamin D.     Lifestyle    Exercise for at least 150 minutes a week (30 minutes a day, 5 days a week). This will help you control your weight and prevent disease.     Limit alcohol to one drink per day.     No smoking.     Wear sunscreen to prevent skin cancer.     See your dentist every six months for an exam and cleaning.      Preventive Health Recommendations  Male Ages 40 to 49    Yearly exam:             See your health care provider every year in order to  o   Review health changes.   o   Discuss preventive care.    o   Review your medicines if your doctor has prescribed any.    You should be tested each year for STDs (sexually transmitted diseases) if you re at risk.     Have a cholesterol test every 5 years.     Have a colonoscopy (test for colon cancer) if someone in your family has had colon cancer or polyps before age 50.     After age 45, have a diabetes test (fasting glucose). If you are  at risk for diabetes, you should have this test every 3 years.      Talk with your health care provider about whether or not a prostate cancer screening test (PSA) is right for you.    Shots: Get a flu shot each year. Get a tetanus shot every 10 years.     Nutrition:    Eat at least 5 servings of fruits and vegetables daily.     Eat whole-grain bread, whole-wheat pasta and brown rice instead of white grains and rice.     Get adequate Calcium and Vitamin D.     Lifestyle    Exercise for at least 150 minutes a week (30 minutes a day, 5 days a week). This will help you control your weight and prevent disease.     Limit alcohol to one drink per day.     No smoking.     Wear sunscreen to prevent skin cancer.     See your dentist every six months for an exam and cleaning.

## 2019-02-15 NOTE — PROGRESS NOTES
SUBJECTIVE:   CC: Edelmira Sharp is an 43 year old male who presents for preventative health visit.     Physical   Annual:     Getting at least 3 servings of Calcium per day:  NO    Bi-annual eye exam:  Yes    Dental care twice a year:  Yes    Sleep apnea or symptoms of sleep apnea:  None    Diet:  Carbohydrate counting    Frequency of exercise:  2-3 days/week    Duration of exercise:  15-30 minutes    Taking medications regularly:  Yes    Medication side effects:  None    PHQ-2 Total Score: 2              Today's PHQ-2 Score:   PHQ-2 ( 1999 [employer]) 2/15/2019   Q1: Little interest or pleasure in doing things 1   Q2: Feeling down, depressed or hopeless 1   PHQ-2 Score 2   Q1: Little interest or pleasure in doing things Several days   Q2: Feeling down, depressed or hopeless Several days   PHQ-2 Score 2       Abuse: Current or Past(Physical, Sexual or Emotional)- No  Do you feel safe in your environment? Yes    Social History     Tobacco Use     Smoking status: Never Smoker     Smokeless tobacco: Never Used   Substance Use Topics     Alcohol use: No     Alcohol Use 2/15/2019   If you drink alcohol do you typically have greater than 3 drinks per day OR greater than 7 drinks per week? Not Applicable       Last PSA: No results found for: PSA    Reviewed orders with patient. Reviewed health maintenance and updated orders accordingly - Yes  Labs reviewed in EPIC  BP Readings from Last 3 Encounters:   02/15/19 103/57   11/06/18 91/59   08/21/18 101/62    Wt Readings from Last 3 Encounters:   02/15/19 94.3 kg (208 lb)   11/06/18 94.1 kg (207 lb 6.4 oz)   11/06/18 94.1 kg (207 lb 6.4 oz)                  Patient Active Problem List   Diagnosis     ACL (anterior cruciate ligament) tear     Right knee pain     Hip pain     Hyperlipidemia LDL goal <160     Bariatric surgery status     Postsurgical malabsorption     Past Surgical History:   Procedure Laterality Date     ABDOMEN SURGERY  1985     LAPAROSCOPIC BYPASS GASTRIC N/A  2/21/2018    Procedure: LAPAROSCOPIC BYPASS GASTRIC;  LAPAROSCOPIC REY EN Y GASTRIC BYPASS ;  Surgeon: Niels Jang MD;  Location:  OR     ORTHOPEDIC SURGERY Right 1996    ACL       Social History     Tobacco Use     Smoking status: Never Smoker     Smokeless tobacco: Never Used   Substance Use Topics     Alcohol use: No     Family History   Problem Relation Age of Onset     Diabetes Mother      Prostate Cancer Maternal Uncle          Current Outpatient Medications   Medication Sig Dispense Refill     Acetaminophen (TYLENOL PO) Take 500 mg by mouth once        Calcium Citrate-Vitamin D (CALCIUM CITRATE + D3 PO) Take 500 mg by mouth 3 times daily Has 800 UIs vitamin d per chew       Cholecalciferol (VITAMIN D PO) Take 4,000 Int'l Units by mouth daily        Cyanocobalamin (B-12) 2500 MCG TABS Take 1 chew tab by mouth three times a week       Ferrous Sulfate (IRON) 325 (65 FE) MG tablet Take 1 tablet by mouth daily       Multiple Vitamins-Minerals (PX MENS MULTIVITAMINS PO) Take 2 tablets by mouth daily        No Known Allergies    Reviewed and updated as needed this visit by clinical staff  Tobacco  Allergies  Meds  Med Hx  Surg Hx  Fam Hx  Soc Hx        Reviewed and updated as needed this visit by Provider        Past Medical History:   Diagnosis Date     ACL (anterior cruciate ligament) tear 3/13/2014     CARDIOVASCULAR SCREENING; LDL GOAL LESS THAN 160 3/13/2014     Gastric bypass status for obesity      Hip pain 3/13/2014     Hyperlipidemia LDL goal <160 3/13/2014     Labral tear of hip, degenerative      Lumbago      Obese      Right knee pain 3/13/2014      Past Surgical History:   Procedure Laterality Date     ABDOMEN SURGERY  1985     LAPAROSCOPIC BYPASS GASTRIC N/A 2/21/2018    Procedure: LAPAROSCOPIC BYPASS GASTRIC;  LAPAROSCOPIC REY EN Y GASTRIC BYPASS ;  Surgeon: Niels Jang MD;  Location:  OR     ORTHOPEDIC SURGERY Right 1996    ACL       Review of Systems  "  Constitutional: Negative for chills and fever.   HENT: Negative for congestion, ear pain, hearing loss and sore throat.    Eyes: Negative for pain and visual disturbance.   Respiratory: Negative for cough and shortness of breath.    Cardiovascular: Negative for chest pain, palpitations and peripheral edema.   Gastrointestinal: Negative for abdominal pain, constipation, diarrhea, heartburn, hematochezia and nausea.   Genitourinary: Negative for discharge, dysuria, frequency, genital sores, hematuria, impotence and urgency.   Musculoskeletal: Negative for arthralgias, joint swelling and myalgias.   Skin: Negative for rash.   Neurological: Positive for dizziness and weakness. Negative for headaches and paresthesias.   Psychiatric/Behavioral: Negative for mood changes. The patient is not nervous/anxious.          OBJECTIVE:   /57 (BP Location: Right arm, Patient Position: Chair, Cuff Size: Adult Large)   Pulse 62   Temp 97.8  F (36.6  C) (Oral)   Resp 16   Ht 1.803 m (5' 11\")   Wt 94.3 kg (208 lb)   SpO2 100%   BMI 29.01 kg/m      Physical Exam  GENERAL: healthy, alert and no distress  EYES: Eyes grossly normal to inspection, PERRL and conjunctivae and sclerae normal  HENT: ear canals and TM's normal, nose and mouth without ulcers or lesions  NECK: no adenopathy, no asymmetry, masses, or scars and thyroid normal to palpation  RESP: lungs clear to auscultation - no rales, rhonchi or wheezes  CV: regular rate and rhythm, normal S1 S2, no S3 or S4, no murmur, click or rub, no peripheral edema and peripheral pulses strong  ABDOMEN: soft, nontender, no hepatosplenomegaly, no masses and bowel sounds normal  MS: no gross musculoskeletal defects noted, no edema  SKIN: no suspicious lesions or rashes  NEURO: Normal strength and tone, mentation intact and speech normal  PSYCH: mentation appears normal, affect normal/bright    Diagnostic Test Results:  Results for orders placed or performed in visit on 02/15/19 " "(from the past 24 hour(s))   HEMOGLOBIN A1C   Result Value Ref Range    Hemoglobin A1C 5.3 0 - 5.6 %   CBC with platelets   Result Value Ref Range    WBC 8.2 4.0 - 11.0 10e9/L    RBC Count 4.86 4.4 - 5.9 10e12/L    Hemoglobin 14.7 13.3 - 17.7 g/dL    Hematocrit 45.2 40.0 - 53.0 %    MCV 93 78 - 100 fl    MCH 30.2 26.5 - 33.0 pg    MCHC 32.5 31.5 - 36.5 g/dL    RDW 12.9 10.0 - 15.0 %    Platelet Count 263 150 - 450 10e9/L       ASSESSMENT/PLAN:   1. Screening for HIV (human immunodeficiency virus)    - HIV Screening    2. Screening for diabetic peripheral neuropathy  Not needed, DM resolved.    3. Routine physical examination  Pt is doing very well, except for his fatigue and dizziness occasionally.   - HEMOGLOBIN A1C  - Lipid panel reflex to direct LDL Fasting    4. Bariatric surgery status  Check below test, and follow oup with bariatric surgery.  - Ferritin  - Iron and Iron Binding Capacity  - Parathyroid Hormone Intact  - Vitamin D Screen  - Vitamin B12  - CBC with platelets    5. Postsurgical malabsorption    - Ferritin  - Iron and Iron Binding Capacity  - Parathyroid Hormone Intact  - Vitamin D Screen  - Vitamin B12  - CBC with platelets    6. Fatigue, unspecified type  Unsure of etiology, can be related to his recent significant weight loss, encourage exercise, and keep close monitoring, check above blood tests and follow up in 3 months.  Noticed to have low blood pressure today, will keep close monitoring       COUNSELING:   Reviewed preventive health counseling, as reflected in patient instructions       Regular exercise    BP Readings from Last 1 Encounters:   02/15/19 103/57     Estimated body mass index is 29.01 kg/m  as calculated from the following:    Height as of this encounter: 1.803 m (5' 11\").    Weight as of this encounter: 94.3 kg (208 lb).      Weight management plan: Discussed healthy diet and exercise guidelines     reports that  has never smoked. he has never used smokeless " tobacco.      Counseling Resources:  ATP IV Guidelines  Pooled Cohorts Equation Calculator  FRAX Risk Assessment  ICSI Preventive Guidelines  Dietary Guidelines for Americans, 2010  USDA's MyPlate  ASA Prophylaxis  Lung CA Screening    Smita Montoya MD  [facility]

## 2019-02-16 LAB
DEPRECATED CALCIDIOL+CALCIFEROL SERPL-MC: 34 UG/L (ref 20–75)
HIV 1+2 AB+HIV1 P24 AG SERPL QL IA: NONREACTIVE

## 2019-02-21 ENCOUNTER — OFFICE VISIT (OUTPATIENT)
Dept: SURGERY | Facility: CLINIC | Age: 44
End: 2019-02-21
Payer: COMMERCIAL

## 2019-02-21 VITALS
DIASTOLIC BLOOD PRESSURE: 65 MMHG | BODY MASS INDEX: 29.25 KG/M2 | OXYGEN SATURATION: 100 % | HEIGHT: 71 IN | SYSTOLIC BLOOD PRESSURE: 108 MMHG | WEIGHT: 208.9 LBS | HEART RATE: 59 BPM

## 2019-02-21 VITALS — WEIGHT: 209 LBS | HEIGHT: 71 IN | BODY MASS INDEX: 29.26 KG/M2

## 2019-02-21 DIAGNOSIS — Z98.84 BARIATRIC SURGERY STATUS: ICD-10-CM

## 2019-02-21 DIAGNOSIS — E16.1 REACTIVE HYPOGLYCEMIA: ICD-10-CM

## 2019-02-21 DIAGNOSIS — K91.2 POSTSURGICAL MALABSORPTION: ICD-10-CM

## 2019-02-21 DIAGNOSIS — E66.3 OVERWEIGHT: ICD-10-CM

## 2019-02-21 DIAGNOSIS — E86.1 HYPOTENSION DUE TO HYPOVOLEMIA: ICD-10-CM

## 2019-02-21 DIAGNOSIS — Z98.84 BARIATRIC SURGERY STATUS: Primary | ICD-10-CM

## 2019-02-21 PROCEDURE — 97803 MED NUTRITION INDIV SUBSEQ: CPT | Performed by: DIETITIAN, REGISTERED

## 2019-02-21 PROCEDURE — 99214 OFFICE O/P EST MOD 30 MIN: CPT | Performed by: PHYSICIAN ASSISTANT

## 2019-02-21 ASSESSMENT — MIFFLIN-ST. JEOR
SCORE: 1859.69
SCORE: 1860.13

## 2019-02-21 ASSESSMENT — PATIENT HEALTH QUESTIONNAIRE - PHQ9: SUM OF ALL RESPONSES TO PHQ QUESTIONS 1-9: 3

## 2019-02-21 NOTE — PROGRESS NOTES
BARIATRIC FOLLOW UP VISIT     February 21, 2019       HISTORY OF PRESENT ILLNESS: Pt returns today for his follow-up appointment status post laparoscopic gastric bypass.Since his last visit he states he is doing better.  Is getting less episodes of reactive hypoglycemia. . Did have it yesterday after shoveling snow.  Breakfast was a 9:30 had 2 eggs, florence bread,  Yogurt. then shoveled and went to mall with kids.  Episode happened at 1:30.  Had not ate since breakfast.  Was planning on eating once at mall.        Is still getting occasional lightheadedness upon standing.  Relates this mostly to fluid intake like discussed at last appointment.     Per the dietician the patient continues to skip meals frequently.  He has reintroduced carbs to his diet now that he has lost the majority of his weight.  He states he is starting to crave sweets again. Feels his body needs them. Has some concerns about this.      Pt does state he feels generally his energy is low.  He thinks twice before he does anything. He feels weaker since surgery.  Has loss muscle tone but also   Had more motivation before surgery.  Not sure if this is due to his change in mood.  Is much calmer now since surgery.  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  Feels he is more clear headed.     Initial Weight: 307 lb 11.2 oz (139.6 kg)   Current Weight: 208 lb 14.4 oz (94.8 kg)  Cumulative weight loss (lbs): 98.8  Last Visits Weight: 207 lb 6.4 oz (94.1 kg)     Patient is taking the following bariatric postoperative vitamins:  2 Complete multivitamins with minerals (at different times than calcium)   4000 International Units of Vitamin D daily  3827-6413 mg of Calcium daily in divided doses  2500 mcg of Vitamin B12 sl TIW  1 Iron/Vit C. daily    Pt is exercising once a week at the gym.  Would like to increase it to 4. Worked out more in the beginning when trying to lose the weight but since at goal motivation is lacking.  Barriers to going more-none.   "Just has no motivation.    When at the gym does strength training after 15 minutes on the treadmill to warm up.    Shoulder, chest, 3x reps.  Treadmill 15 minutes.       OBESITY RELATED CONDITIONS:  Type II diabetes-resolved  Hyperlipidemia-present     SOCIAL HISTORY:  Pt denies smoking.  Pt denies alcohol use. Denies taking Excedrin or NSAIDS.    REVIEW OF SYSTEMS:     GI:  Nausea-none  Vomiting-none  Diarrhea-none  Constipation-none  Dysphagia-none  Abdominal Pain-none  Heartburn-none     SKIN:  Intertriginous irritation-none, excess abdominal skin at pannus,    Psych:  Depression: None  PHQ-9 SCORE 2/21/2019   PHQ-9 Total Score 3     LABS/IMAGING/MEDICAL RECORDS REVIEW:   Component      Latest Ref Rng & Units 2/15/2019   WBC      4.0 - 11.0 10e9/L 8.2   RBC Count      4.4 - 5.9 10e12/L 4.86   Hemoglobin      13.3 - 17.7 g/dL 14.7   Hematocrit      40.0 - 53.0 % 45.2   MCV      78 - 100 fl 93   MCH      26.5 - 33.0 pg 30.2   MCHC      31.5 - 36.5 g/dL 32.5   RDW      10.0 - 15.0 % 12.9   Platelet Count      150 - 450 10e9/L 263   Iron      35 - 180 ug/dL 116   Iron Binding Cap      240 - 430 ug/dL 331   Iron Saturation Index      15 - 46 % 35   Ferritin      26 - 388 ng/mL 113   Parathyroid Hormone Intact      18 - 80 pg/mL 24   Vitamin D Deficiency screening      20 - 75 ug/L 34   Vitamin B12      193 - 986 pg/mL 1,036 (H)       PHYSICAL EXAMINATION:   /65 (BP Location: Right arm, Patient Position: Sitting, Cuff Size: Adult Large)   Pulse 59   Ht 5' 11\" (1.803 m)   Wt 208 lb 14.4 oz (94.8 kg)   SpO2 100%   BMI 29.14 kg/m    HEART: No JVD  LUNGS: Breathing unlabored.  ABD: Soft, NT, incisions well healed  SKIN: No intertriginous irritation under pannus  MUSC: Gait normal  NEURO: Alert and oriented x3.    ASSESSMENT AND PLAN:    1 years status laparoscopic gastric bypass  Overweight Body mass index is 29.14 kg/m ..  Post surgical malabsorption:   Reviewed CBC, vitamin B12, vitamin D, PTH, ferritin, TIBC, " and iron labs.                  Iron deficiency-resolved. D/C iron supplement.   Continue taking recommended post-op vitamins but increase vitamin D to 6000 international unit(s) daily. This may help low energy.  Low energy/fatigue   Follow food plan per dietitian recommendations.   Restart activity and incorporate into lifestyle.  Discussed deconditioning. This and his erratic eating habits are most likely contributing to his low energy.  Orthostatic hypotension   Reviewed MOA for this. Most likely due to dehydration and low blood pressure. Discussed pushing fluids when this symptoms is present. Also discussed holding on to something to avoid falling.      Follow up: Return to clinic in 1 year.       I spent a total of 25 minutes face to face with Edelmira during today's office visit. Over 50% of this time was spent counseling the patient and/or coordinating care.

## 2019-02-21 NOTE — PROGRESS NOTES
"NUTRITION POST OP APPOINTMENT  DATE OF VISIT: February 21, 2019    Edelmira Sharp  1975  male  [id number]  43 year old     ASSESSMENT:    REASON FOR VISIT:  Edelmira is a 43 year old year old male presents today for 1 year PO nutrition follow-up appointment. Patient is accompanied by self.    DIAGNOSIS:  Status post gastric bypass surgery.  Obesity Overweight BMI 25-29.9     ANTHROPOMETRICS:  Initial Weight: 307.7 lbs   Height: 180.3 cm (5' 11\")  Current Weight: 94.8 kg (208 lb 15.9 oz)   BMI: 29.21 kg/(m^2).    VITAMINS AND MINERALS:  2 Multivitamin with Minerals  600 mg Calcium With Vitamin D BID  4000 International units Vitamin D  2500 mcg Vitamin B-12 sublingual, 3x/week  Vitron C Daily     NUTRITION HISTORY:  Breakfast: (skips) or eggs + slice of bread dipped in olive oil + yogurt + cucumbers and tomatoes   Lunch: (skips)  Supper: 4:30-5pm: stuffed vegetables  rice and chicken  rice and beans  barley soup  Snacks: fruit (orange, 1/2 banana, apple)   Fluids consumed: Water (48oz)  Consuming liquid calories: No  Protein intake: 50-60 grams/day  Tolerate regular texture food: Yes  Any foods not tolerated details: Yes  If any food not tolerated: milk   Portion size: 1 cup or more  Take 20-30 minutes to consume each meal: No   Eat protein foods first: No  Fluids and meals separate by at least 30 minutes: Yes  Chew foods thoroughly: Yes  Tolerating diet: Yes  Drinking high protein supplements: No  Consuming snacks per day: 2  Additional Information: Pt continues to be inconsistent in meal/snack intake, subsequently continues to struggle with dizziness and hypoglycemic episodes. Slight improvement in all areas since last visit. Reinforced the need to have consistent meals and snacks to prevent low BG, discussed meal/snack ideas. Provided guidance and hand outs on appropriate levels of exercise. Pt states that he now has a mindset of \"I've reached my goal, I can eat like a regular person now.\" Discussed problems with " this way of thinking, reiterated the need to maintain good habits.       PHYSICAL ACTIVITY:  Type: minimal/snow shoveling    DIAGNOSIS:  Previous Nutrition Diagnosis: Altered gastrointestinal function related to alteration in gastrointestinal structure as evidenced by history of gastric bypass surgery.- no change    Previous goals:  Aim for 3 meals/day, 1 cup in size. - not met  To help with hypoglycemia, have 2 snacks, 1/2c in size - not met  Separate fluids and meals - met  Always have a source of protein at all meals and snacks. - not met  Do not skip meals - not met    Current Nutrition Diagnosis: Altered gastrointestinal function related to alteration in gastrointestinal structure as evidenced by history of gastric bypass surgery.    INTERVENTION:   Nutrition Prescription: Eat 3 meals a day at regular intervals. Consume 60-90 grams of protein daily. Follow post-surgical vitamin and mineral protocol.  Assessed learning needs and learning preferences.    GOALS:  Switch to Fair life, Lactaid or soy milk  Eat 3 meals/day (1 cup in size) + 2 snacks/day (1/2c) to prevent hypoglycemia  Have 64oz of water  Include protein with your snacks (examples provided)    Follow-Up:   Recommend annual follow up visits to assist with lifestyle changes or per insurance.  Implementation: Discussed progress toward previous goals; reinforced importance of following bariatric lifestyle changes.    NUTRITION MONITORING AND EVALUATION:  Anticipated compliance: fair  Verbalized fair-good understanding.    Follow up: Patient to follow up in 12 months, at 2 years post-op    TIME SPENT WITH PATIENT:  25 minutes    Rachel Winston RD, LD  Clinical Dietitian

## 2019-02-22 PROBLEM — E16.1 REACTIVE HYPOGLYCEMIA: Status: ACTIVE | Noted: 2019-02-22

## 2020-03-02 ENCOUNTER — HEALTH MAINTENANCE LETTER (OUTPATIENT)
Age: 45
End: 2020-03-02

## 2020-04-14 ENCOUNTER — OFFICE VISIT (OUTPATIENT)
Dept: SURGERY | Facility: CLINIC | Age: 45
End: 2020-04-14
Payer: COMMERCIAL

## 2020-04-14 VITALS — WEIGHT: 213 LBS | HEIGHT: 71 IN | BODY MASS INDEX: 29.82 KG/M2

## 2020-04-14 VITALS — WEIGHT: 213 LBS | BODY MASS INDEX: 29.82 KG/M2 | HEIGHT: 71 IN

## 2020-04-14 DIAGNOSIS — M25.511 ACUTE PAIN OF RIGHT SHOULDER: ICD-10-CM

## 2020-04-14 DIAGNOSIS — Z98.84 BARIATRIC SURGERY STATUS: ICD-10-CM

## 2020-04-14 DIAGNOSIS — E66.3 OVERWEIGHT (BMI 25.0-29.9): ICD-10-CM

## 2020-04-14 DIAGNOSIS — E16.1 REACTIVE HYPOGLYCEMIA: ICD-10-CM

## 2020-04-14 DIAGNOSIS — L65.0 TELOGEN EFFLUVIUM: ICD-10-CM

## 2020-04-14 DIAGNOSIS — K91.2 POSTSURGICAL MALABSORPTION: ICD-10-CM

## 2020-04-14 DIAGNOSIS — Z98.84 BARIATRIC SURGERY STATUS: Primary | ICD-10-CM

## 2020-04-14 PROCEDURE — 99213 OFFICE O/P EST LOW 20 MIN: CPT | Mod: 95 | Performed by: PHYSICIAN ASSISTANT

## 2020-04-14 PROCEDURE — 97803 MED NUTRITION INDIV SUBSEQ: CPT | Mod: 95 | Performed by: DIETITIAN, REGISTERED

## 2020-04-14 ASSESSMENT — MIFFLIN-ST. JEOR
SCORE: 1873.29
SCORE: 1873.29

## 2020-04-14 NOTE — PROGRESS NOTES
"Edelmira Sharp is a 44 year old male who is being evaluated via a billable telephone visit.      The patient has been notified of following:     \"This telephone visit will be conducted via a call between you and your physician/provider. We have found that certain health care needs can be provided without the need for a physical exam.  This service lets us provide the care you need with a short phone conversation.  If a prescription is necessary we can send it directly to your pharmacy.  If lab work is needed we can place an order for that and you can then stop by our lab to have the test done at a later time.    Telephone visits are billed at different rates depending on your insurance coverage. During this emergency period, for some insurers they may be billed the same as an in-person visit.  Please reach out to your insurance provider with any questions.    If during the course of the call the physician/provider feels a telephone visit is not appropriate, you will not be charged for this service.\"    Patient has given verbal consent for Telephone visit?  Yes    How would you like to obtain your AVS? Alisha Renteria     Edelmira Sharp is a 44 year old male who presents to clinic today for the following health issues:          BARIATRIC TELEPHONIC FOLLOW UP      April 14, 2020    HISTORY OF PRESENT ILLNESS: Pt presents today for his telephonic follow-up appointment status post laparoscopic gastric bypass.  Last year he was dealing with reactive hypoglycemia.  He started monitoring his carbs and this seemed to help sometimes.  He is trying to stay away from heavy carbs but can not always do so.  His blood sugars initially go up to near 200's and crashes down to the 50's.  He continues to skip meals.  Mainly skipping breakfast.  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 Sometimes he will take a banana and apple       213 lbs 0 oz    Wt Readings from Last 4 Encounters: "   04/14/20 213 lb (96.6 kg)   02/21/19 208 lb 15.9 oz (94.8 kg)   02/21/19 208 lb 14.4 oz (94.8 kg)   02/15/19 208 lb (94.3 kg)         BARIATRIC METRICS:  Current Weight: 213 lb (96.6 kg)(Pt reported)  Body mass index is 29.71 kg/m .   Wt change since last visit (lbs): 4.01  Cumulative weight loss (lbs): 94      Patient is taking the following bariatric postoperative vitamins:  Not taking any post op vitamins for 3 months.  He felt that he did not need them anymore.        Pt is not exercising.  Feels like his basement stairs at work are his workout. Goes up and down about 20 times daily       OBESITY RELATED CONDITIONS:  Diabetes  - last hemoglobin A1C on 10/23/2019 = 5.3  High cholesterol       SOCIAL HISTORY:  Pt denies smoking.  Pt denies alcohol use.  Avoids NSAIDS.  Takes advil 200 mg daily for the past 4 months. Lately has been taking it because he hurt his right shoulder recently. No caffeine use      REVIEW OF SYSTEMS:  GI:  Nausea- No  Vomiting- No  Diarrhea- No  Constipation- No  50 oz daily of water  Dysphagia- No  Abdominal Pain- No  Heartburn- No     SKIN:  Intertriginous irritation- No  Telogen effluvium - yes     PSYCH:  Depression- No  Anxiety- No      LABS/IMAGING/MEDICAL RECORDS REVIEW:   Vitamin D Deficiency screening   Date Value Ref Range Status   02/15/2019 34 20 - 75 ug/L Final     Comment:     Season, race, dietary intake, and treatment affect the concentration of   25-hydroxy-Vitamin D. Values may decrease during winter months and increase   during summer months. Values 20-29 ug/L may indicate Vitamin D insufficiency   and values <20 ug/L may indicate Vitamin D deficiency.  Vitamin D determination is routinely performed by an immunoassay specific for   25 hydroxyvitamin D3.  If an individual is on vitamin D2 (ergocalciferol)   supplementation, please specify 25 OH vitamin D2 and D3 level determination by   LCMSMS test [id number].       Parathyroid Hormone Intact   Date Value Ref Range Status  "  02/15/2019 24 18 - 80 pg/mL Final     Vitamin B12   Date Value Ref Range Status   02/15/2019 1,036 (H) 193 - 986 pg/mL Final     Hemoglobin   Date Value Ref Range Status   02/15/2019 14.7 13.3 - 17.7 g/dL Final     Ferritin   Date Value Ref Range Status   02/15/2019 113 26 - 388 ng/mL Final     Iron   Date Value Ref Range Status   02/15/2019 116 35 - 180 ug/dL Final     Iron Binding Cap   Date Value Ref Range Status   02/15/2019 331 240 - 430 ug/dL Final     Iron Saturation Index   Date Value Ref Range Status   02/15/2019 35 15 - 46 % Final   09/26/2018 26 15 - 46 % Final   05/23/2018 17 15 - 46 % Final       PHYSICAL EXAMINATION:  Ht 5' 11\" (1.803 m)   Wt 213 lb (96.6 kg)   BMI 29.71 kg/m      GENERAL:   Pt sounds in NAD  Alert and Oriented        ASSESSMENT AND PLAN:        Bariatric surgery status   - 14 months S/P Gastric Bypass    Reactive hypoglycemia   - Stressed the importance of eating all meals daily and avoiding excessive carbs    Telogen effluvium  -     Iron; Future  -     Iron and Iron Binding Capacity; Future  -     Ferritin; Future   Discussed patient starting biotin but more importantly RESTART all post op vitamins    Postsurgical malabsorption  -     CBC with platelets; Future  -     Vitamin B12; Future  -     Vitamin D Screen; Future  -     Parathyroid Hormone Intact; Future  -     Iron; Future  -     Iron and Iron Binding Capacity; Future  -     Ferritin; Future  -     Vitamin B1 whole blood; Future   - RESTART all PO vitamins    Overweight (BMI 25.0-29.9)    Right Shoulder Pain   Discussed avoiding ALL NSAIDs as it can cause injury to his gastric staple line.  Should only take otc tylenol.  Can  schedule with his primary to discuss other non-NSAID options for pain relief      Follow up: Return to clinic in 2 month to see PA and diet.  Will evaluate how patient is doing with his reactive hypoglycemia and check his labs.        Call start: 2:43  Call end : 3:04    Phone call duration:  21 " minutes    Alma Beaulieu MS, PANaderC

## 2020-04-14 NOTE — PROGRESS NOTES
"Edelmira Sharp is a 44 year old male who is being evaluated via a billable telephone visit.      The patient has been notified of following by MA:     \"This telephone visit will be conducted via a call between you and your physician/provider. We have found that certain health care needs can be provided without the need for a physical exam.  This service lets us provide the care you need with a short phone conversation.  If a prescription is necessary we can send it directly to your pharmacy.  If lab work is needed we can place an order for that and you can then stop by our lab to have the test done at a later time.    Telephone visits are billed at different rates depending on your insurance coverage. During this emergency period, for some insurers they may be billed the same as an in-person visit.  Please reach out to your insurance provider with any questions.    If during the course of the call the physician/provider feels a telephone visit is not appropriate, you will not be charged for this service.\"    Patient has given verbal consent for Telephone visit?  Yes    NUTRITION POST OP APPOINTMENT  DATE OF VISIT: April 14, 2020    Edelmira Sharp  1975  male  [id number]  44 year old     ASSESSMENT:    REASON FOR VISIT:  Edelmira is a 44 year old year old male presents today for 2 year PO nutrition follow-up appointment.     DIAGNOSIS:  Status post gastric bypass surgery.  Obesity Overweight BMI 25-29.9     ANTHROPOMETRICS:  Initial Weight: 307.7 lbs   Height: 5' 11\"   Current Weight: 213 lbs 0 oz     BMI: Body mass index is 29.71 kg/m .    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  Supper: [5-6pm] - meat + rice + vegetables  Snacks: none  Fluids consumed: Water (50-60oz), coffee (8oz, decaf)  Consuming liquid calories: No  Protein intake: 40-50 " grams/day  Tolerate regular texture food: Yes  Any foods not tolerated details: No  If any food not tolerated: n/a  Portion size: 1 cup or more: 1.5-2c  Take 20-30 minutes to consume each meal: No   Eat protein foods first: Yes  Fluids and meals separate by at least 30 minutes: Yes  Chew foods thoroughly: Yes  Tolerating diet: Yes  Drinking high protein supplements: No  Consuming snacks per day: none  Additional Information: Pt feels he's lacked motivation for the past several months. Continues to struggle with reactive hypoglycemia. Reviewed the importance of consistent meals. Recommended bring lunch from home. Pt will try to start walking more and will get back into the gym once re-opened. Reviewed vitamin/mineral needs.       PHYSICAL ACTIVITY:  None/inconsistent    DIAGNOSIS:  Previous Nutrition Diagnosis: Altered gastrointestinal function related to alteration in gastrointestinal structure as evidenced by history of gastric bypass surgery.- no change    Previous goals:  Switch to Fair life, Lactaid or soy milk - not met  Eat 3 meals/day (1 cup in size) + 2 snacks/day (1/2c) to prevent hypoglycemia -not met  Have 64oz of water - met  Include protein with your snacks (examples provided) - not met    Current Nutrition Diagnosis: Altered gastrointestinal function related to alteration in gastrointestinal structure as evidenced by history of gastric bypass surgery.    INTERVENTION:   Nutrition Prescription: Eat 3 meals a day at regular intervals. Consume 60-90 grams of protein daily. Follow post-surgical vitamin and mineral protocol.  Assessed learning needs and learning preferences.    GOALS:  Begin taking all post-op vitamins/minerals per guidelines  Eat breakfast daily and include protein  Bring lunch from home    Follow-Up:   Recommend annual follow up visits to assist with lifestyle changes or per insurance.  Implementation: Discussed progress toward previous goals; reinforced importance of following bariatric  lifestyle changes.    NUTRITION MONITORING AND EVALUATION:  Anticipated compliance: fair  Verbalized fair-good understanding.    Follow up: Patient to follow up in 2 months.    Phone call duration:  20 minutes      Rachel Winston RD, LD  Clinical Dietitian

## 2020-07-17 ENCOUNTER — TELEPHONE (OUTPATIENT)
Dept: SURGERY | Facility: CLINIC | Age: 45
End: 2020-07-17

## 2020-07-17 DIAGNOSIS — R10.13 EPIGASTRIC PAIN: Primary | ICD-10-CM

## 2020-07-17 DIAGNOSIS — Z98.84 BARIATRIC SURGERY STATUS: ICD-10-CM

## 2020-07-17 RX ORDER — OMEPRAZOLE 40 MG/1
40 CAPSULE, DELAYED RELEASE ORAL DAILY
Qty: 30 CAPSULE | Refills: 1 | Status: SHIPPED | OUTPATIENT
Start: 2020-07-17 | End: 2020-09-17

## 2020-07-17 NOTE — TELEPHONE ENCOUNTER
Pt left  on nurse line about his pain.    RN called pt back.   Pt states he started having epigastric pain radiating a little to left side yesterday about noon.  Earlier in the day pt had taken 1 tab of ES excedrin with 250 mg aspirin and 65 mg of caffeine on an empty stomach with just a little water.  Began having sharp/cramping pain to epigastric area and could not eat for the rest of the day.  Pain comes and goes; worst is 7/10.  Pt states pain decreased some overnight, was less intense this morning.  Pain is now 1/10.  When pain is bad he feels nausea.  Is not nauseated now.  Denies other pain to abdomen, denies heartburn, vomiting, constipation or diarrhea.  Pt is concerned he may have ulcer.    Hx:  Pt had gastric bypass with Dr. Jang on 2/21/2018.  Last seen 4/14/2020 by RIKKI Beaulieu PA-C.  At that time had been taking daily 200 mg Advil for his shoulder.  Told to stop ALL NSAIDs at that time.  Pt states he fell and injured shoulder in May.  States he has been taking 1 tab of either tylenol, Advil, or ES Excedrin (whatever he has right then) once a day since May.  States he has shoulder surgery with Tria scheduled for 8/21/2020; not sure what meds he will be told to take after that.    Habits:  See above about medication.  States he has additional caffeine--8 oz regular coffee daily.  Denies ETOH or tobacco use.  No carbonation.    Pt wants to know what he should do for pain and if he has an ulcer.    Will consult [facility] and call pt back with response.  Tosha Hector RN on 7/17/2020 at 11:24 AM

## 2020-07-17 NOTE — TELEPHONE ENCOUNTER
Called pt back.  Reviewed care plan in below note.    Reviewed with pt:  -directions for omeprazole  -PRN Mylanta for breakthrough pain  -STOP ALL NSAIDS including Advil and Excedrin  -stop or decrease use of coffee; switch to decaf  -visit with PA next week to check on status/plan for surgery    Assisted pt to make next week's appointment.  Pt agreeable to plan; will call back PRN.  Tosha Hector RN on 7/17/2020 at 2:17 PM

## 2020-07-17 NOTE — TELEPHONE ENCOUNTER
ASSESSMENT AND PLAN:      1. Epigastric pain  Start   - omeprazole (PRILOSEC) 40 MG DR capsule; Take 1 capsule (40 mg) by mouth daily  Dispense: 30 capsule; Refill: 1    Can use Mylanta prn pain.    F/U in 1 week to discuss symptoms, things to avoid to prevent ulcers, upcoming Tria surgery.

## 2020-09-11 NOTE — PROGRESS NOTES
"Edelmira Sharp is a 44 year old male who is being evaluated via a billable telephone visit.      The patient has been notified of following:     \"This telephone visit will be conducted via a call between you and your physician/provider. We have found that certain health care needs can be provided without the need for a physical exam.  This service lets us provide the care you need with a short phone conversation.  If a prescription is necessary we can send it directly to your pharmacy.  If lab work is needed we can place an order for that and you can then stop by our lab to have the test done at a later time.    Telephone visits are billed at different rates depending on your insurance coverage. During this emergency period, for some insurers they may be billed the same as an in-person visit.  Please reach out to your insurance provider with any questions.    If during the course of the call the physician/provider feels a telephone visit is not appropriate, you will not be charged for this service.\"    Patient has given verbal consent for Telephone visit?  Yes    How would you like to obtain your AVS? [facility]    BARIATRIC TELEPHONIC FOLLOW UP     September 18, 2020    HISTORY OF PRESENT ILLNESS: Pt presents today for his telephonic follow-up appointment status post laparoscopic gastric bypass.  Last year he was dealing with reactive hypoglycemia. Pt continues hypoglycemia if eating something with too much sugar. Today he returns to follow up on a possible ulcer.      In July he called into clinic with epigastric pain. He was using Excedrin daily with caffeine on an empty stomach. He was to STOP ALL NSAIDS including Advil and Excedrin. Stop or decrease use of coffee; switch to decaf.   Was to start on omeprazole 40 mg daily and use Mylanta for breakthrough pain and follow up in 1 month.  He finished a 30 day course of omeprazole and did not start the refill because his pain resolved.     Is experiencing symptoms of " postural hypotension similar to after surgery. Not monitoring his hydration but knows it is low.      217 lbs 0 oz    Wt Readings from Last 4 Encounters:   09/17/20 217 lb (98.4 kg)   04/14/20 213 lb (96.6 kg)   04/14/20 213 lb (96.6 kg)   02/21/19 208 lb 15.9 oz (94.8 kg)         BARIATRIC METRICS:  Current Weight: 213 lb (96.6 kg)(Pt reported)  Body mass index is 30.27 kg/m .   Wt change since last visit (lbs): 4.01  Cumulative weight loss (lbs): 94      Patient is taking the following bariatric postoperative vitamins:  Not taking any post op vitamins at least since 2019     Pt is not exercising.  Feels like his basement stairs at work are his workout. Is losing energy even more.  Has not been going to the gym and thinks this is why his energy is down.        OBESITY RELATED CONDITIONS:  Diabetes  - last hemoglobin A1C on 10/23/2019 = 5.3  High cholesterol       SOCIAL HISTORY:  Pt denies smoking. Has stopped using Excedrin daily.  Was using it since his appt in April. Quit when he called in with his ulcer.   Lately has been taking tylenol because he hurt his right shoulder.   Scheduled for surgery Oct 22.  Did minimize days to work due to shoulder injury..  He has 8 oz of caffeine daily.  He states he has a decrease of energy.        REVIEW OF SYSTEMS:  GI:  Nausea- No  Vomiting- No  Diarrhea- No  Constipation- No    Dysphagia- No  Abdominal Pain- yes, occasional butter since ulcer healed  Heartburn- yes     SKIN:  Intertriginous irritation- No     PSYCH:  Depression- No  Anxiety- No      LABS/IMAGING/MEDICAL RECORDS REVIEW:   Vitamin D Deficiency screening   Date Value Ref Range Status   02/15/2019 34 20 - 75 ug/L Final     Comment:     Season, race, dietary intake, and treatment affect the concentration of   25-hydroxy-Vitamin D. Values may decrease during winter months and increase   during summer months. Values 20-29 ug/L may indicate Vitamin D insufficiency   and values <20 ug/L may indicate Vitamin D  "deficiency.  Vitamin D determination is routinely performed by an immunoassay specific for   25 hydroxyvitamin D3.  If an individual is on vitamin D2 (ergocalciferol)   supplementation, please specify 25 OH vitamin D2 and D3 level determination by   LCMSMS test [id number].       Parathyroid Hormone Intact   Date Value Ref Range Status   02/15/2019 24 18 - 80 pg/mL Final     Vitamin B12   Date Value Ref Range Status   02/15/2019 1,036 (H) 193 - 986 pg/mL Final     Hemoglobin   Date Value Ref Range Status   02/15/2019 14.7 13.3 - 17.7 g/dL Final     Ferritin   Date Value Ref Range Status   02/15/2019 113 26 - 388 ng/mL Final     Iron   Date Value Ref Range Status   02/15/2019 116 35 - 180 ug/dL Final     Iron Binding Cap   Date Value Ref Range Status   02/15/2019 331 240 - 430 ug/dL Final     Iron Saturation Index   Date Value Ref Range Status   02/15/2019 35 15 - 46 % Final   09/26/2018 26 15 - 46 % Final   05/23/2018 17 15 - 46 % Final       PHYSICAL EXAMINATION:  Ht 5' 11\" (1.803 m)   Wt 217 lb (98.4 kg)   BMI 30.27 kg/m      GENERAL:   Pt sounds in NAD  Alert and Oriented      ASSESSMENT AND PLAN:    1. Bariatric surgery status  2 yrs s/p gastric bypass    2. Postsurgical malabsorption  Restart taking recommended post-op vitamins. (schedule in pt instructions.)  Labs ordered per protocol.  - CBC with platelets; Future  - Vitamin B12; Future  - Vitamin A; Future  - Vitamin D Screen; Future  - Parathyroid Hormone Intact; Future  - Iron; Future  - Iron and Iron Binding Capacity; Future  - Ferritin; Future  - Copper level; Future  - Zinc; Future  - vitamin A 3 MG (11753 UNITS) capsule; Take 1 capsule (10,000 Units) by mouth daily  Dispense: 30 capsule; Refill: 1    3. Fatigue, unspecified type  Ordered  - TSH with free T4 reflex; Future    4. Reactive hypoglycemia  Stressed the importance of eating all meals daily and avoiding excessive carbs  Ordered  - Hemoglobin A1c; Future    5. Hypotension due to " hypovolemia  Recommend increasing hydration.      6. History of diabetes mellitus, type II  Ordered  - Hemoglobin A1c; Future    7. Overweight  Pt commits to going back to the gym minimum 2-3 times a week.  This should help with fatigue and joint pain.    8. Right Shoulder Pain  Discussed avoiding ALL NSAIDs as it can cause injury to his gastric staple line.  Should only take otc tylenol.    Take H2 receptor blocker daily to decrease epigastric irritation since you plan on continuing coffee daily and still have rights shoulder pain   - famotidine (PEPCID) 40 MG tablet; Take 1 tablet (40 mg) by mouth daily  Dispense: 90 tablet; Refill: 3    Follow up: Return to clinic in 2 months following shoulder surgery to check on NSAID use and epigastric pain.     Total visit time: 25 min   Start 9:04 am  End 9:29 am

## 2020-09-17 ENCOUNTER — VIRTUAL VISIT (OUTPATIENT)
Dept: SURGERY | Facility: CLINIC | Age: 45
End: 2020-09-17
Payer: COMMERCIAL

## 2020-09-17 VITALS — HEIGHT: 71 IN | WEIGHT: 217 LBS | BODY MASS INDEX: 30.38 KG/M2

## 2020-09-17 DIAGNOSIS — E66.3 OVERWEIGHT: ICD-10-CM

## 2020-09-17 DIAGNOSIS — Z98.84 BARIATRIC SURGERY STATUS: Primary | ICD-10-CM

## 2020-09-17 DIAGNOSIS — K91.2 POSTSURGICAL MALABSORPTION: ICD-10-CM

## 2020-09-17 DIAGNOSIS — E86.1 HYPOTENSION DUE TO HYPOVOLEMIA: ICD-10-CM

## 2020-09-17 DIAGNOSIS — R53.83 FATIGUE, UNSPECIFIED TYPE: ICD-10-CM

## 2020-09-17 DIAGNOSIS — E16.1 REACTIVE HYPOGLYCEMIA: ICD-10-CM

## 2020-09-17 DIAGNOSIS — Z86.39 HISTORY OF DIABETES MELLITUS, TYPE II: ICD-10-CM

## 2020-09-17 PROCEDURE — 99214 OFFICE O/P EST MOD 30 MIN: CPT | Mod: TEL | Performed by: PHYSICIAN ASSISTANT

## 2020-09-17 RX ORDER — CHOLECALCIFEROL (VITAMIN D3) 125 MCG
10000 CAPSULE ORAL DAILY
Qty: 30 CAPSULE | Refills: 1 | Status: SHIPPED | OUTPATIENT
Start: 2020-09-17 | End: 2020-09-18

## 2020-09-17 RX ORDER — FAMOTIDINE 40 MG/1
40 TABLET, FILM COATED ORAL DAILY
Qty: 90 TABLET | Refills: 3 | Status: SHIPPED | OUTPATIENT
Start: 2020-09-17

## 2020-09-17 ASSESSMENT — MIFFLIN-ST. JEOR: SCORE: 1891.44

## 2020-09-17 NOTE — PATIENT INSTRUCTIONS
Restart Bariatric Postoperative Vitamins     2 Complete multivitamins with minerals* (at different times than calcium)   2 tablets at bedtime   Vitamin D   5000 Int. Units at bedtime     Calcium+D  8391-3658 mg in divided doses (Do not take at same time as multivitamin or iron)   600 mg in morning  600 mg in afternoon    Vitamin B12   2500 mcg SL 3 x a week  At night    Iron supplement 65 mg with vitamin C    1 at bedtime     *Multivitamin should contain at least     18 mg of Iron, 400 mg of Folic Acid,  2 mg of Copper,  1.5 mg of Thiamine.

## 2020-09-21 DIAGNOSIS — Z86.39 HISTORY OF DIABETES MELLITUS, TYPE II: ICD-10-CM

## 2020-09-21 DIAGNOSIS — R53.83 FATIGUE, UNSPECIFIED TYPE: ICD-10-CM

## 2020-09-21 DIAGNOSIS — Z98.84 BARIATRIC SURGERY STATUS: ICD-10-CM

## 2020-09-21 DIAGNOSIS — E16.1 REACTIVE HYPOGLYCEMIA: ICD-10-CM

## 2020-09-21 DIAGNOSIS — K91.2 POSTSURGICAL MALABSORPTION: ICD-10-CM

## 2020-09-21 LAB
ERYTHROCYTE [DISTWIDTH] IN BLOOD BY AUTOMATED COUNT: 12.2 % (ref 10–15)
HBA1C MFR BLD: 5.4 % (ref 0–5.6)
HCT VFR BLD AUTO: 43 % (ref 40–53)
HGB BLD-MCNC: 14.2 G/DL (ref 13.3–17.7)
MCH RBC QN AUTO: 30.1 PG (ref 26.5–33)
MCHC RBC AUTO-ENTMCNC: 33 G/DL (ref 31.5–36.5)
MCV RBC AUTO: 91 FL (ref 78–100)
PLATELET # BLD AUTO: 220 10E9/L (ref 150–450)
PTH-INTACT SERPL-MCNC: 44 PG/ML (ref 18–80)
RBC # BLD AUTO: 4.71 10E12/L (ref 4.4–5.9)
VIT B12 SERPL-MCNC: 706 PG/ML (ref 193–986)
WBC # BLD AUTO: 9.6 10E9/L (ref 4–11)

## 2020-09-21 PROCEDURE — 83550 IRON BINDING TEST: CPT | Performed by: PHYSICIAN ASSISTANT

## 2020-09-21 PROCEDURE — 83540 ASSAY OF IRON: CPT | Performed by: PHYSICIAN ASSISTANT

## 2020-09-21 PROCEDURE — 84590 ASSAY OF VITAMIN A: CPT | Mod: 90 | Performed by: PHYSICIAN ASSISTANT

## 2020-09-21 PROCEDURE — 83036 HEMOGLOBIN GLYCOSYLATED A1C: CPT | Performed by: PHYSICIAN ASSISTANT

## 2020-09-21 PROCEDURE — 85027 COMPLETE CBC AUTOMATED: CPT | Performed by: PHYSICIAN ASSISTANT

## 2020-09-21 PROCEDURE — 82525 ASSAY OF COPPER: CPT | Mod: 90 | Performed by: PHYSICIAN ASSISTANT

## 2020-09-21 PROCEDURE — 82306 VITAMIN D 25 HYDROXY: CPT | Performed by: PHYSICIAN ASSISTANT

## 2020-09-21 PROCEDURE — 84630 ASSAY OF ZINC: CPT | Mod: 90 | Performed by: PHYSICIAN ASSISTANT

## 2020-09-21 PROCEDURE — 82728 ASSAY OF FERRITIN: CPT | Performed by: PHYSICIAN ASSISTANT

## 2020-09-21 PROCEDURE — 82607 VITAMIN B-12: CPT | Performed by: PHYSICIAN ASSISTANT

## 2020-09-21 PROCEDURE — 84443 ASSAY THYROID STIM HORMONE: CPT | Performed by: PHYSICIAN ASSISTANT

## 2020-09-21 PROCEDURE — 36415 COLL VENOUS BLD VENIPUNCTURE: CPT | Performed by: PHYSICIAN ASSISTANT

## 2020-09-21 PROCEDURE — 99000 SPECIMEN HANDLING OFFICE-LAB: CPT | Performed by: PHYSICIAN ASSISTANT

## 2020-09-21 PROCEDURE — 83970 ASSAY OF PARATHORMONE: CPT | Performed by: PHYSICIAN ASSISTANT

## 2020-09-22 ENCOUNTER — TELEPHONE (OUTPATIENT)
Dept: SURGERY | Facility: CLINIC | Age: 45
End: 2020-09-22

## 2020-09-22 LAB
DEPRECATED CALCIDIOL+CALCIFEROL SERPL-MC: 28 UG/L (ref 20–75)
FERRITIN SERPL-MCNC: 69 NG/ML (ref 26–388)
IRON SATN MFR SERPL: 30 % (ref 15–46)
IRON SERPL-MCNC: 110 UG/DL (ref 35–180)
TIBC SERPL-MCNC: 364 UG/DL (ref 240–430)
TSH SERPL DL<=0.005 MIU/L-ACNC: 2.09 MU/L (ref 0.4–4)

## 2020-09-22 NOTE — TELEPHONE ENCOUNTER
Called pharmacy at PA request to inform of cancelled order for Vitamin A.  Informed order has been picked up already, was picked up by patient on 9/20/2020. Pharmacy canceled refills on this today.      Called pt.  Pt states he has not started his Vitamin A yet.   Instructed pt not to take this unless instructed by clinic.  Pt states he will hold off taking until directed.  Awaiting labs results pending.  Tosha Hector RN on 9/22/2020 at 8:49 AM

## 2020-09-23 LAB
ANNOTATION COMMENT IMP: NORMAL
COPPER SERPL-MCNC: 91.4 UG/DL (ref 70–140)
RETINYL PALMITATE SERPL-MCNC: 0.03 MG/L (ref 0–0.1)
VIT A SERPL-MCNC: 0.41 MG/L (ref 0.3–1.2)
ZINC SERPL-MCNC: 74.2 UG/DL (ref 60–120)

## 2020-09-25 NOTE — TELEPHONE ENCOUNTER
Medication; omeprazole 40 mg capsules    Last Written Prescription Date:  NOT PRESCRIBED  Last Fill Quantity: ,  # refills:    Last office visit: 07/21/2020 with prescribing provider:  Mely yi PA-C   Future Office Visit:  no      [facility]; [facility]; [phone]  F; [phone]

## 2020-11-13 ENCOUNTER — TELEPHONE (OUTPATIENT)
Dept: SURGERY | Facility: CLINIC | Age: 45
End: 2020-11-13

## 2020-11-13 NOTE — TELEPHONE ENCOUNTER
"Patient called because he was seen for stomach pain 9/17/2020.  He has the following questions:    1 Does medication we prescribed for his stomach lead to weight gain?    2 What can we do about this pain he is having in his stomach one weekly that is related to foods he eats?  States pain is related to \"bad diet - pizza\".  When he has this at night and goes to sleep it is too acidic and gives him stomach pain.   Patient is having upcoming shoulder surgery and would like this to be resolved.    States he is still taking omeprazole 40 mg every morning and needs another prescription.  Has not filled the famotidine 40 mg daily that [facility] provided 9/17/2020.  Is using Mylanta PRN for stomach pain and works for the most part.    Switched to decaf.  No NSAID'S  No smoking or alcohol     States he starts the morning off good by drinking milk and has a banana and yogurt     Also wants info re: plastic surgery emailed to him.     Recommended   Attempt to avoid offensive foods that cause stomach pain.  Can take Mylanta as per [facility] 9/17/2020 for stomach pain related to above.   Can fill the Famotidine and take.  Emailed plastic surgery info.   He is to RTC after shoulder surgery in Jan.     Rosa Crouch, MS, RD, RN      "

## 2020-12-14 ENCOUNTER — HEALTH MAINTENANCE LETTER (OUTPATIENT)
Age: 45
End: 2020-12-14

## 2021-05-21 ENCOUNTER — TELEPHONE (OUTPATIENT)
Dept: SURGERY | Facility: CLINIC | Age: 46
End: 2021-05-21

## 2021-05-21 NOTE — TELEPHONE ENCOUNTER
Actually, pt didn't request them... We ask where they'd like them drawn IF the provider desires them. Thanks Suyapa!

## 2021-10-02 ENCOUNTER — HEALTH MAINTENANCE LETTER (OUTPATIENT)
Age: 46
End: 2021-10-02

## 2022-01-22 ENCOUNTER — HEALTH MAINTENANCE LETTER (OUTPATIENT)
Age: 47
End: 2022-01-22

## 2022-09-03 ENCOUNTER — HEALTH MAINTENANCE LETTER (OUTPATIENT)
Age: 47
End: 2022-09-03

## 2023-04-29 ENCOUNTER — HEALTH MAINTENANCE LETTER (OUTPATIENT)
Age: 48
End: 2023-04-29

## 2023-05-12 NOTE — TELEPHONE ENCOUNTER
Labs entered by [facility].  Patient informed what labs were entered and to remind/ask his provider to draw them tomorrow when he is seen.  Patient verbalized understanding and is agreeable to plan.  Rosa Crouch MS, RD, RN     Please inform patient that test result was within normal parameters. There are some labs still pending  Thank you.     Verito Ashby M.D.

## (undated) DEVICE — STPL RELOAD REG TISSUE ECHELON 60 X 3.6MM BLUE GST60B

## (undated) DEVICE — ESU GROUND PAD UNIVERSAL W/O CORD

## (undated) DEVICE — SU SILK 2-0 SH 30" K833H

## (undated) DEVICE — DRAPE BREAST/CHEST 29420

## (undated) DEVICE — ENDO TROCAR OPTICAL 12MM VERSAONE W/STD FIX CAN UNVCA12STF

## (undated) DEVICE — SU VICRYL 0 TIE 12X18" J906G

## (undated) DEVICE — GLOVE PROTEXIS W/NEU-THERA 8.0  2D73TE80

## (undated) DEVICE — ENDO TROCAR OPTICAL 12MM VERSAPORT PLUS W/FIX CAN ONB12STF

## (undated) DEVICE — SU VICRYL 4-0 PS-2 18" UND J496H

## (undated) DEVICE — STPL POWERED ECHELON 60MM PSEE60A

## (undated) DEVICE — PAD CHUX UNDERPAD 23X24" 7136

## (undated) DEVICE — ENDO TROCAR FIRST ENTRY KII FIOS Z-THRD 12X100MM CTF73

## (undated) DEVICE — DRSG BANDAID 3/4X3"

## (undated) DEVICE — SUCTION IRR STRYKERFLOW II W/TIP 250-070-520

## (undated) DEVICE — SOL NACL 0.9% INJ 1000ML BAG 2B1324X

## (undated) DEVICE — INTRODUCER EEA STPL TRANS ANAL/ABD 25MM EEATAID25

## (undated) DEVICE — SYR 50ML CATH TIP W/O NDL 309620

## (undated) DEVICE — BNDG ABDOMINAL BINDER 9X62-84" 79-89210

## (undated) DEVICE — PACK LAP CHOLE SLC15LCFSD

## (undated) DEVICE — ESU HARMONIC ACE LAP SHEARS STRYKER ACE+ 7 5MMX36CM HARH36

## (undated) DEVICE — TUBE GASTRIC EVACUATOR STOMACH 32FR LATEX

## (undated) DEVICE — SOL NACL 0.9% IRRIG 1000ML BOTTLE 07138-09

## (undated) DEVICE — SU VICRYL 2-0 SH 27" J317H

## (undated) DEVICE — LUBRICANT INST KIT ENDO-LUBE 220-90

## (undated) DEVICE — SUCTION CANISTER MEDIVAC LINER 3000ML W/LID 65651-530

## (undated) DEVICE — ESU HOLDER LAP INST DISP PURPLE LONG 330MM H-PRO-330

## (undated) DEVICE — PATCH PERI STRP VERIT PSD 6006-ECH-V

## (undated) DEVICE — PREP CHLORAPREP 26ML TINTED ORANGE  260815

## (undated) DEVICE — LINEN TOWEL PACK X5 5464

## (undated) DEVICE — DRSG GAUZE 4X4" 3033

## (undated) DEVICE — GOWN IMPERVIOUS SPECIALTY XLG/XLONG 32474

## (undated) DEVICE — STPL CIRCULAR XL 25MM W/TILT TIP EEAXL25

## (undated) RX ORDER — CEFAZOLIN SODIUM 1 G/50ML
SOLUTION INTRAVENOUS
Status: DISPENSED
Start: 2018-02-21

## (undated) RX ORDER — VECURONIUM BROMIDE 1 MG/ML
INJECTION, POWDER, LYOPHILIZED, FOR SOLUTION INTRAVENOUS
Status: DISPENSED
Start: 2018-02-21

## (undated) RX ORDER — FENTANYL CITRATE 50 UG/ML
INJECTION, SOLUTION INTRAMUSCULAR; INTRAVENOUS
Status: DISPENSED
Start: 2018-02-21

## (undated) RX ORDER — DEXAMETHASONE SODIUM PHOSPHATE 4 MG/ML
INJECTION, SOLUTION INTRA-ARTICULAR; INTRALESIONAL; INTRAMUSCULAR; INTRAVENOUS; SOFT TISSUE
Status: DISPENSED
Start: 2018-02-21

## (undated) RX ORDER — ONDANSETRON 2 MG/ML
INJECTION INTRAMUSCULAR; INTRAVENOUS
Status: DISPENSED
Start: 2018-02-21

## (undated) RX ORDER — LIDOCAINE HYDROCHLORIDE 20 MG/ML
INJECTION, SOLUTION EPIDURAL; INFILTRATION; INTRACAUDAL; PERINEURAL
Status: DISPENSED
Start: 2018-02-21

## (undated) RX ORDER — PROPOFOL 10 MG/ML
INJECTION, EMULSION INTRAVENOUS
Status: DISPENSED
Start: 2018-02-21

## (undated) RX ORDER — HYDROMORPHONE HYDROCHLORIDE 1 MG/ML
INJECTION, SOLUTION INTRAMUSCULAR; INTRAVENOUS; SUBCUTANEOUS
Status: DISPENSED
Start: 2018-02-21

## (undated) RX ORDER — HEPARIN SODIUM 5000 [USP'U]/.5ML
INJECTION, SOLUTION INTRAVENOUS; SUBCUTANEOUS
Status: DISPENSED
Start: 2018-02-21

## (undated) RX ORDER — GLYCOPYRROLATE 0.2 MG/ML
INJECTION, SOLUTION INTRAMUSCULAR; INTRAVENOUS
Status: DISPENSED
Start: 2018-02-21

## (undated) RX ORDER — BUPIVACAINE HYDROCHLORIDE AND EPINEPHRINE 5; 5 MG/ML; UG/ML
INJECTION, SOLUTION EPIDURAL; INTRACAUDAL; PERINEURAL
Status: DISPENSED
Start: 2018-02-21

## (undated) RX ORDER — EPINEPHRINE 1 MG/ML
INJECTION, SOLUTION INTRAMUSCULAR; SUBCUTANEOUS
Status: DISPENSED
Start: 2018-02-21

## (undated) RX ORDER — BUPIVACAINE HYDROCHLORIDE 2.5 MG/ML
INJECTION, SOLUTION EPIDURAL; INFILTRATION; INTRACAUDAL
Status: DISPENSED
Start: 2018-02-21